# Patient Record
Sex: MALE | Race: WHITE | NOT HISPANIC OR LATINO | Employment: OTHER | URBAN - NONMETROPOLITAN AREA
[De-identification: names, ages, dates, MRNs, and addresses within clinical notes are randomized per-mention and may not be internally consistent; named-entity substitution may affect disease eponyms.]

---

## 2018-11-23 ENCOUNTER — APPOINTMENT (OUTPATIENT)
Dept: GENERAL RADIOLOGY | Facility: HOSPITAL | Age: 83
End: 2018-11-23

## 2018-11-23 ENCOUNTER — ANESTHESIA (OUTPATIENT)
Dept: PERIOP | Facility: HOSPITAL | Age: 83
End: 2018-11-23

## 2018-11-23 ENCOUNTER — HOSPITAL ENCOUNTER (INPATIENT)
Facility: HOSPITAL | Age: 83
LOS: 7 days | Discharge: HOME | End: 2018-11-30
Attending: INTERNAL MEDICINE | Admitting: UROLOGY

## 2018-11-23 ENCOUNTER — ANESTHESIA EVENT (OUTPATIENT)
Dept: PERIOP | Facility: HOSPITAL | Age: 83
End: 2018-11-23

## 2018-11-23 DIAGNOSIS — N32.0 BLADDER NECK CONTRACTURE: ICD-10-CM

## 2018-11-23 DIAGNOSIS — R33.9 URINARY RETENTION: Primary | ICD-10-CM

## 2018-11-23 LAB
BACTERIA UR QL AUTO: ABNORMAL /HPF
BILIRUB UR QL STRIP: NEGATIVE
CLARITY UR: ABNORMAL
COLOR UR: ABNORMAL
GLUCOSE UR STRIP-MCNC: NEGATIVE MG/DL
HGB UR QL STRIP.AUTO: ABNORMAL
KETONES UR QL STRIP: ABNORMAL
LEUKOCYTE ESTERASE UR QL STRIP.AUTO: ABNORMAL
NITRITE UR QL STRIP: POSITIVE
PH UR STRIP.AUTO: 7 [PH] (ref 5–8)
PROT UR QL STRIP: ABNORMAL
RBC # UR: ABNORMAL /HPF
REF LAB TEST METHOD: ABNORMAL
SP GR UR STRIP: 1.01 (ref 1–1.03)
SQUAMOUS #/AREA URNS HPF: ABNORMAL /HPF
UROBILINOGEN UR QL STRIP: ABNORMAL
WBC UR QL AUTO: ABNORMAL /HPF

## 2018-11-23 PROCEDURE — 93005 ELECTROCARDIOGRAM TRACING: CPT | Performed by: INTERNAL MEDICINE

## 2018-11-23 PROCEDURE — C1769 GUIDE WIRE: HCPCS | Performed by: UROLOGY

## 2018-11-23 PROCEDURE — 94799 UNLISTED PULMONARY SVC/PX: CPT

## 2018-11-23 PROCEDURE — 81001 URINALYSIS AUTO W/SCOPE: CPT | Performed by: INTERNAL MEDICINE

## 2018-11-23 PROCEDURE — 25010000002 FENTANYL CITRATE (PF) 100 MCG/2ML SOLUTION: Performed by: NURSE ANESTHETIST, CERTIFIED REGISTERED

## 2018-11-23 PROCEDURE — 71045 X-RAY EXAM CHEST 1 VIEW: CPT

## 2018-11-23 PROCEDURE — BT10ZZZ FLUOROSCOPY OF BLADDER: ICD-10-PCS | Performed by: UROLOGY

## 2018-11-23 PROCEDURE — 74018 RADEX ABDOMEN 1 VIEW: CPT

## 2018-11-23 PROCEDURE — 25010000002 IOPAMIDOL 61 % SOLUTION: Performed by: UROLOGY

## 2018-11-23 PROCEDURE — C1726 CATH, BAL DIL, NON-VASCULAR: HCPCS | Performed by: UROLOGY

## 2018-11-23 PROCEDURE — 0T9B80Z DRAINAGE OF BLADDER WITH DRAINAGE DEVICE, VIA NATURAL OR ARTIFICIAL OPENING ENDOSCOPIC: ICD-10-PCS | Performed by: UROLOGY

## 2018-11-23 PROCEDURE — 25010000002 PROPOFOL 10 MG/ML EMULSION: Performed by: NURSE ANESTHETIST, CERTIFIED REGISTERED

## 2018-11-23 PROCEDURE — 25010000002 LEVOFLOXACIN PER 250 MG: Performed by: UROLOGY

## 2018-11-23 PROCEDURE — 0T7C8ZZ DILATION OF BLADDER NECK, VIA NATURAL OR ARTIFICIAL OPENING ENDOSCOPIC: ICD-10-PCS | Performed by: UROLOGY

## 2018-11-23 PROCEDURE — 99223 1ST HOSP IP/OBS HIGH 75: CPT | Performed by: UROLOGY

## 2018-11-23 PROCEDURE — 52005 CYSTO W/URTRL CATHJ: CPT | Performed by: UROLOGY

## 2018-11-23 PROCEDURE — 87086 URINE CULTURE/COLONY COUNT: CPT | Performed by: UROLOGY

## 2018-11-23 PROCEDURE — 93010 ELECTROCARDIOGRAM REPORT: CPT | Performed by: INTERNAL MEDICINE

## 2018-11-23 PROCEDURE — 94760 N-INVAS EAR/PLS OXIMETRY 1: CPT

## 2018-11-23 RX ORDER — FENTANYL CITRATE 50 UG/ML
INJECTION, SOLUTION INTRAMUSCULAR; INTRAVENOUS AS NEEDED
Status: DISCONTINUED | OUTPATIENT
Start: 2018-11-23 | End: 2018-11-23 | Stop reason: SURG

## 2018-11-23 RX ORDER — LEVOFLOXACIN 5 MG/ML
500 INJECTION, SOLUTION INTRAVENOUS ONCE
Status: COMPLETED | OUTPATIENT
Start: 2018-11-23 | End: 2018-11-23

## 2018-11-23 RX ORDER — ROFLUMILAST 500 UG/1
500 TABLET ORAL DAILY
Status: DISCONTINUED | OUTPATIENT
Start: 2018-11-23 | End: 2018-11-30 | Stop reason: HOSPADM

## 2018-11-23 RX ORDER — OXYCODONE AND ACETAMINOPHEN 7.5; 325 MG/1; MG/1
2 TABLET ORAL ONCE AS NEEDED
Status: DISCONTINUED | OUTPATIENT
Start: 2018-11-23 | End: 2018-11-23 | Stop reason: HOSPADM

## 2018-11-23 RX ORDER — ONDANSETRON 2 MG/ML
4 INJECTION INTRAMUSCULAR; INTRAVENOUS EVERY 6 HOURS PRN
Status: DISCONTINUED | OUTPATIENT
Start: 2018-11-23 | End: 2018-11-25

## 2018-11-23 RX ORDER — SODIUM CHLORIDE 0.9 % (FLUSH) 0.9 %
3 SYRINGE (ML) INJECTION EVERY 12 HOURS SCHEDULED
Status: DISCONTINUED | OUTPATIENT
Start: 2018-11-23 | End: 2018-11-24

## 2018-11-23 RX ORDER — PROPOFOL 10 MG/ML
VIAL (ML) INTRAVENOUS AS NEEDED
Status: DISCONTINUED | OUTPATIENT
Start: 2018-11-23 | End: 2018-11-23 | Stop reason: SURG

## 2018-11-23 RX ORDER — ACETAMINOPHEN 325 MG/1
650 TABLET ORAL EVERY 4 HOURS PRN
Status: DISCONTINUED | OUTPATIENT
Start: 2018-11-23 | End: 2018-11-30 | Stop reason: HOSPADM

## 2018-11-23 RX ORDER — DABIGATRAN ETEXILATE 75 MG/1
75 CAPSULE ORAL 2 TIMES DAILY
Status: ON HOLD | COMMUNITY
End: 2018-11-25

## 2018-11-23 RX ORDER — PANTOPRAZOLE SODIUM 40 MG/1
40 TABLET, DELAYED RELEASE ORAL DAILY
COMMUNITY
End: 2020-05-05 | Stop reason: HOSPADM

## 2018-11-23 RX ORDER — BUDESONIDE AND FORMOTEROL FUMARATE DIHYDRATE 160; 4.5 UG/1; UG/1
2 AEROSOL RESPIRATORY (INHALATION)
COMMUNITY
End: 2020-05-05 | Stop reason: HOSPADM

## 2018-11-23 RX ORDER — DEXTROSE MONOHYDRATE 25 G/50ML
12.5 INJECTION, SOLUTION INTRAVENOUS AS NEEDED
Status: DISCONTINUED | OUTPATIENT
Start: 2018-11-23 | End: 2018-11-30 | Stop reason: HOSPADM

## 2018-11-23 RX ORDER — IPRATROPIUM BROMIDE AND ALBUTEROL SULFATE 2.5; .5 MG/3ML; MG/3ML
3 SOLUTION RESPIRATORY (INHALATION) ONCE AS NEEDED
Status: DISCONTINUED | OUTPATIENT
Start: 2018-11-23 | End: 2018-11-23 | Stop reason: HOSPADM

## 2018-11-23 RX ORDER — BUDESONIDE AND FORMOTEROL FUMARATE DIHYDRATE 160; 4.5 UG/1; UG/1
2 AEROSOL RESPIRATORY (INHALATION)
Status: DISCONTINUED | OUTPATIENT
Start: 2018-11-23 | End: 2018-11-30 | Stop reason: HOSPADM

## 2018-11-23 RX ORDER — PANTOPRAZOLE SODIUM 40 MG/1
40 TABLET, DELAYED RELEASE ORAL DAILY
Status: DISCONTINUED | OUTPATIENT
Start: 2018-11-23 | End: 2018-11-30 | Stop reason: HOSPADM

## 2018-11-23 RX ORDER — ONDANSETRON 2 MG/ML
4 INJECTION INTRAMUSCULAR; INTRAVENOUS EVERY 6 HOURS PRN
Status: DISCONTINUED | OUTPATIENT
Start: 2018-11-23 | End: 2018-11-30 | Stop reason: HOSPADM

## 2018-11-23 RX ORDER — FENTANYL CITRATE 50 UG/ML
25 INJECTION, SOLUTION INTRAMUSCULAR; INTRAVENOUS
Status: DISCONTINUED | OUTPATIENT
Start: 2018-11-23 | End: 2018-11-25

## 2018-11-23 RX ORDER — SODIUM CHLORIDE, SODIUM LACTATE, POTASSIUM CHLORIDE, CALCIUM CHLORIDE 600; 310; 30; 20 MG/100ML; MG/100ML; MG/100ML; MG/100ML
9 INJECTION, SOLUTION INTRAVENOUS CONTINUOUS
Status: DISCONTINUED | OUTPATIENT
Start: 2018-11-23 | End: 2018-11-25

## 2018-11-23 RX ORDER — OXYCODONE AND ACETAMINOPHEN 10; 325 MG/1; MG/1
1 TABLET ORAL ONCE AS NEEDED
Status: DISCONTINUED | OUTPATIENT
Start: 2018-11-23 | End: 2018-11-23 | Stop reason: HOSPADM

## 2018-11-23 RX ORDER — ACETAMINOPHEN 325 MG/1
650 TABLET ORAL EVERY 4 HOURS PRN
Status: DISCONTINUED | OUTPATIENT
Start: 2018-11-23 | End: 2018-11-25

## 2018-11-23 RX ORDER — SODIUM CHLORIDE, SODIUM LACTATE, POTASSIUM CHLORIDE, CALCIUM CHLORIDE 600; 310; 30; 20 MG/100ML; MG/100ML; MG/100ML; MG/100ML
INJECTION, SOLUTION INTRAVENOUS CONTINUOUS PRN
Status: DISCONTINUED | OUTPATIENT
Start: 2018-11-23 | End: 2018-11-23 | Stop reason: SURG

## 2018-11-23 RX ORDER — METOCLOPRAMIDE HYDROCHLORIDE 5 MG/ML
5 INJECTION INTRAMUSCULAR; INTRAVENOUS
Status: DISCONTINUED | OUTPATIENT
Start: 2018-11-23 | End: 2018-11-23 | Stop reason: HOSPADM

## 2018-11-23 RX ORDER — MEPERIDINE HYDROCHLORIDE 25 MG/ML
12.5 INJECTION INTRAMUSCULAR; INTRAVENOUS; SUBCUTANEOUS
Status: DISCONTINUED | OUTPATIENT
Start: 2018-11-23 | End: 2018-11-23 | Stop reason: HOSPADM

## 2018-11-23 RX ORDER — ONDANSETRON 2 MG/ML
4 INJECTION INTRAMUSCULAR; INTRAVENOUS ONCE AS NEEDED
Status: DISCONTINUED | OUTPATIENT
Start: 2018-11-23 | End: 2018-11-23 | Stop reason: HOSPADM

## 2018-11-23 RX ORDER — LABETALOL HYDROCHLORIDE 5 MG/ML
5 INJECTION, SOLUTION INTRAVENOUS
Status: DISCONTINUED | OUTPATIENT
Start: 2018-11-23 | End: 2018-11-23 | Stop reason: HOSPADM

## 2018-11-23 RX ORDER — FENTANYL CITRATE 50 UG/ML
25 INJECTION, SOLUTION INTRAMUSCULAR; INTRAVENOUS AS NEEDED
Status: DISCONTINUED | OUTPATIENT
Start: 2018-11-23 | End: 2018-11-23 | Stop reason: HOSPADM

## 2018-11-23 RX ORDER — SODIUM CHLORIDE 0.9 % (FLUSH) 0.9 %
3-10 SYRINGE (ML) INJECTION AS NEEDED
Status: DISCONTINUED | OUTPATIENT
Start: 2018-11-23 | End: 2018-11-25

## 2018-11-23 RX ORDER — MAGNESIUM HYDROXIDE 1200 MG/15ML
LIQUID ORAL AS NEEDED
Status: DISCONTINUED | OUTPATIENT
Start: 2018-11-23 | End: 2018-11-23 | Stop reason: HOSPADM

## 2018-11-23 RX ORDER — MIDAZOLAM HYDROCHLORIDE 1 MG/ML
2 INJECTION INTRAMUSCULAR; INTRAVENOUS
Status: DISCONTINUED | OUTPATIENT
Start: 2018-11-23 | End: 2018-11-25

## 2018-11-23 RX ORDER — LIDOCAINE HYDROCHLORIDE 20 MG/ML
INJECTION, SOLUTION INFILTRATION; PERINEURAL AS NEEDED
Status: DISCONTINUED | OUTPATIENT
Start: 2018-11-23 | End: 2018-11-23 | Stop reason: SURG

## 2018-11-23 RX ORDER — LABETALOL HYDROCHLORIDE 5 MG/ML
10 INJECTION, SOLUTION INTRAVENOUS EVERY 6 HOURS PRN
Status: DISCONTINUED | OUTPATIENT
Start: 2018-11-23 | End: 2018-11-30 | Stop reason: HOSPADM

## 2018-11-23 RX ORDER — MIDAZOLAM HYDROCHLORIDE 1 MG/ML
1 INJECTION INTRAMUSCULAR; INTRAVENOUS
Status: DISCONTINUED | OUTPATIENT
Start: 2018-11-23 | End: 2018-11-25

## 2018-11-23 RX ORDER — IBUPROFEN 600 MG/1
600 TABLET ORAL ONCE AS NEEDED
Status: DISCONTINUED | OUTPATIENT
Start: 2018-11-23 | End: 2018-11-23 | Stop reason: HOSPADM

## 2018-11-23 RX ORDER — LEVOFLOXACIN 5 MG/ML
INJECTION, SOLUTION INTRAVENOUS
Status: COMPLETED
Start: 2018-11-23 | End: 2018-11-23

## 2018-11-23 RX ORDER — ROFLUMILAST 500 UG/1
500 TABLET ORAL DAILY
COMMUNITY
End: 2020-05-05 | Stop reason: HOSPADM

## 2018-11-23 RX ORDER — SODIUM CHLORIDE 0.9 % (FLUSH) 0.9 %
1-10 SYRINGE (ML) INJECTION AS NEEDED
Status: DISCONTINUED | OUTPATIENT
Start: 2018-11-23 | End: 2018-11-30 | Stop reason: HOSPADM

## 2018-11-23 RX ORDER — NALOXONE HCL 0.4 MG/ML
0.4 VIAL (ML) INJECTION AS NEEDED
Status: DISCONTINUED | OUTPATIENT
Start: 2018-11-23 | End: 2018-11-23 | Stop reason: HOSPADM

## 2018-11-23 RX ADMIN — ROFLUMILAST 500 MCG: 500 TABLET ORAL at 17:57

## 2018-11-23 RX ADMIN — IPRATROPIUM BROMIDE 0.5 MG: 0.5 SOLUTION RESPIRATORY (INHALATION) at 20:49

## 2018-11-23 RX ADMIN — PANTOPRAZOLE SODIUM 40 MG: 40 TABLET, DELAYED RELEASE ORAL at 17:57

## 2018-11-23 RX ADMIN — LIDOCAINE HYDROCHLORIDE 100 MG: 20 INJECTION, SOLUTION INFILTRATION; PERINEURAL at 13:34

## 2018-11-23 RX ADMIN — PROPOFOL 100 MG: 10 INJECTION, EMULSION INTRAVENOUS at 13:34

## 2018-11-23 RX ADMIN — LIDOCAINE HYDROCHLORIDE: 20 JELLY TOPICAL at 12:45

## 2018-11-23 RX ADMIN — SODIUM CHLORIDE, POTASSIUM CHLORIDE, SODIUM LACTATE AND CALCIUM CHLORIDE: 600; 310; 30; 20 INJECTION, SOLUTION INTRAVENOUS at 13:21

## 2018-11-23 RX ADMIN — FENTANYL CITRATE 100 MCG: 50 INJECTION, SOLUTION INTRAMUSCULAR; INTRAVENOUS at 13:34

## 2018-11-23 RX ADMIN — BUDESONIDE AND FORMOTEROL FUMARATE DIHYDRATE 2 PUFF: 160; 4.5 AEROSOL RESPIRATORY (INHALATION) at 20:49

## 2018-11-23 RX ADMIN — LEVOFLOXACIN 500 MG: 5 INJECTION, SOLUTION INTRAVENOUS at 13:21

## 2018-11-23 NOTE — ANESTHESIA PROCEDURE NOTES
ANESTHESIA INTUBATION  Urgency: elective    Airway not difficult    General Information and Staff    Patient location during procedure: OR  CRNA: Stanton Lunsford CRNA    Indications and Patient Condition  Indications for airway management: airway protection    Preoxygenated: yes  MILS maintained throughout  Mask difficulty assessment: 1 - vent by mask    Final Airway Details  Final airway type: endotracheal airway      Successful airway: ETT  Cuffed: yes   Successful intubation technique: direct laryngoscopy  Endotracheal tube insertion site: oral  Blade: Adler  Blade size: 2  ETT size (mm): 7.5  Cormack-Lehane Classification: grade I - full view of glottis  Placement verified by: chest auscultation and capnometry   Cuff volume (mL): 5  Measured from: lips  ETT to lips (cm): 20  Number of attempts at approach: 1

## 2018-11-23 NOTE — ANESTHESIA PREPROCEDURE EVALUATION
Anesthesia Evaluation     Patient summary reviewed   no history of anesthetic complications:  NPO Solid Status: > 8 hours             Airway   Mallampati: II  TM distance: >3 FB  Neck ROM: full  Dental    (+) edentulous    Pulmonary    (+) COPD,   (-) sleep apnea, not a smoker  Cardiovascular   Exercise tolerance: good (4-7 METS)    ECG reviewed    (+) hypertension, dysrhythmias Atrial Fib,   (-) pacemaker, CAD, angina, cardiac stents      Neuro/Psych  (+) TIA,     (-) seizures, CVA  GI/Hepatic/Renal/Endo    (+)   renal disease (urinary retention and renal mass),   (-) diabetes    Musculoskeletal     Abdominal    Substance History      OB/GYN          Other                      Anesthesia Plan    ASA 3     general     intravenous induction   Anesthetic plan, all risks, benefits, and alternatives have been provided, discussed and informed consent has been obtained with: patient.

## 2018-11-23 NOTE — ANESTHESIA POSTPROCEDURE EVALUATION
Patient: Gilmar Parish    Procedure Summary     Date:  11/23/18 Room / Location:   [facility] OR  / [facility]    Anesthesia Start:  1321 Anesthesia Stop:  1405    Procedure:  CYSTOSCOPY WITH URETHRAL DIALATION AND COMPLEX CATHETER PLACEMENT (N/A Urethra) Diagnosis:       Urinary retention      (Urinary retention [R33.9])    Surgeon:  Alexx Garcia MD Provider:  David Espitia CRNA    Anesthesia Type:  general ASA Status:  3          Anesthesia Type: general  Last vitals  BP   172/94(nurse notified) (11/23/18 1202)   Temp   97.4 °F (36.3 °C) (11/23/18 1202)   Pulse   90 (11/23/18 1223)   Resp   16 (11/23/18 1202)     SpO2   94 % (11/23/18 1223)     Post Anesthesia Care and Evaluation    Patient location during evaluation: PACU  Patient participation: complete - patient participated  Level of consciousness: awake and alert  Pain management: adequate  Airway patency: patent  Anesthetic complications: No anesthetic complications    Cardiovascular status: acceptable  Respiratory status: acceptable  Hydration status: acceptable    Comments: Blood pressure 172/94, pulse 90, temperature 97.4 °F (36.3 °C), temperature source Oral, resp. rate 16, SpO2 94 %.    Pt discharged from PACU based on edwina score >8

## 2018-11-24 LAB
ANION GAP SERPL CALCULATED.3IONS-SCNC: 9 MMOL/L (ref 4–13)
BUN BLD-MCNC: 13 MG/DL (ref 5–21)
BUN/CREAT SERPL: 11.1 (ref 7–25)
CALCIUM SPEC-SCNC: 8.3 MG/DL (ref 8.4–10.4)
CHLORIDE SERPL-SCNC: 106 MMOL/L (ref 98–110)
CO2 SERPL-SCNC: 26 MMOL/L (ref 24–31)
CREAT BLD-MCNC: 1.17 MG/DL (ref 0.5–1.4)
DEPRECATED RDW RBC AUTO: 45.8 FL (ref 40–54)
ERYTHROCYTE [DISTWIDTH] IN BLOOD BY AUTOMATED COUNT: 15 % (ref 12–15)
FERRITIN SERPL-MCNC: 75.5 NG/ML (ref 17.9–464)
FOLATE SERPL-MCNC: 7.76 NG/ML
GFR SERPL CREATININE-BSD FRML MDRD: 58 ML/MIN/1.73
GLUCOSE BLD-MCNC: 88 MG/DL (ref 70–100)
HCT VFR BLD AUTO: 29 % (ref 40–52)
HGB BLD-MCNC: 9.3 G/DL (ref 14–18)
IRON 24H UR-MRATE: 32 MCG/DL (ref 42–180)
IRON SATN MFR SERPL: 14 % (ref 20–45)
MCH RBC QN AUTO: 27 PG (ref 28–32)
MCHC RBC AUTO-ENTMCNC: 32.1 G/DL (ref 33–36)
MCV RBC AUTO: 84.1 FL (ref 82–95)
PLATELET # BLD AUTO: 289 10*3/MM3 (ref 130–400)
PMV BLD AUTO: 10.6 FL (ref 6–12)
POTASSIUM BLD-SCNC: 3.6 MMOL/L (ref 3.5–5.3)
RBC # BLD AUTO: 3.45 10*6/MM3 (ref 4.8–5.9)
SODIUM BLD-SCNC: 141 MMOL/L (ref 135–145)
TIBC SERPL-MCNC: 224 MCG/DL (ref 225–420)
VIT B12 BLD-MCNC: 522 PG/ML (ref 239–931)
WBC NRBC COR # BLD: 6.66 10*3/MM3 (ref 4.8–10.8)

## 2018-11-24 PROCEDURE — 85027 COMPLETE CBC AUTOMATED: CPT | Performed by: INTERNAL MEDICINE

## 2018-11-24 PROCEDURE — 99233 SBSQ HOSP IP/OBS HIGH 50: CPT | Performed by: UROLOGY

## 2018-11-24 PROCEDURE — 83540 ASSAY OF IRON: CPT | Performed by: INTERNAL MEDICINE

## 2018-11-24 PROCEDURE — 94799 UNLISTED PULMONARY SVC/PX: CPT

## 2018-11-24 PROCEDURE — 80048 BASIC METABOLIC PNL TOTAL CA: CPT | Performed by: INTERNAL MEDICINE

## 2018-11-24 PROCEDURE — 94760 N-INVAS EAR/PLS OXIMETRY 1: CPT

## 2018-11-24 PROCEDURE — 82607 VITAMIN B-12: CPT | Performed by: INTERNAL MEDICINE

## 2018-11-24 PROCEDURE — 82728 ASSAY OF FERRITIN: CPT | Performed by: INTERNAL MEDICINE

## 2018-11-24 PROCEDURE — 83550 IRON BINDING TEST: CPT | Performed by: INTERNAL MEDICINE

## 2018-11-24 PROCEDURE — 25010000002 LEVOFLOXACIN PER 250 MG: Performed by: UROLOGY

## 2018-11-24 PROCEDURE — 82746 ASSAY OF FOLIC ACID SERUM: CPT | Performed by: INTERNAL MEDICINE

## 2018-11-24 RX ORDER — LEVOFLOXACIN 5 MG/ML
500 INJECTION, SOLUTION INTRAVENOUS EVERY 24 HOURS
Status: DISCONTINUED | OUTPATIENT
Start: 2018-11-24 | End: 2018-11-25

## 2018-11-24 RX ORDER — SODIUM CHLORIDE 9 MG/ML
75 INJECTION, SOLUTION INTRAVENOUS CONTINUOUS
Status: DISCONTINUED | OUTPATIENT
Start: 2018-11-24 | End: 2018-11-25

## 2018-11-24 RX ADMIN — BUDESONIDE AND FORMOTEROL FUMARATE DIHYDRATE 2 PUFF: 160; 4.5 AEROSOL RESPIRATORY (INHALATION) at 18:53

## 2018-11-24 RX ADMIN — ROFLUMILAST 500 MCG: 500 TABLET ORAL at 08:54

## 2018-11-24 RX ADMIN — PANTOPRAZOLE SODIUM 40 MG: 40 TABLET, DELAYED RELEASE ORAL at 08:54

## 2018-11-24 RX ADMIN — LEVOFLOXACIN 500 MG: 5 INJECTION, SOLUTION INTRAVENOUS at 14:10

## 2018-11-24 RX ADMIN — SODIUM CHLORIDE 75 ML/HR: 9 INJECTION, SOLUTION INTRAVENOUS at 13:00

## 2018-11-24 RX ADMIN — IPRATROPIUM BROMIDE 0.5 MG: 0.5 SOLUTION RESPIRATORY (INHALATION) at 18:45

## 2018-11-24 RX ADMIN — SODIUM CHLORIDE 500 ML: 0.9 INJECTION, SOLUTION INTRAVENOUS at 13:00

## 2018-11-24 RX ADMIN — IPRATROPIUM BROMIDE 0.5 MG: 0.5 SOLUTION RESPIRATORY (INHALATION) at 06:39

## 2018-11-24 RX ADMIN — BUDESONIDE AND FORMOTEROL FUMARATE DIHYDRATE 2 PUFF: 160; 4.5 AEROSOL RESPIRATORY (INHALATION) at 06:54

## 2018-11-25 LAB
ANION GAP SERPL CALCULATED.3IONS-SCNC: 9 MMOL/L (ref 4–13)
BACTERIA SPEC AEROBE CULT: NORMAL
BUN BLD-MCNC: 11 MG/DL (ref 5–21)
BUN/CREAT SERPL: 10.6 (ref 7–25)
CALCIUM SPEC-SCNC: 8.5 MG/DL (ref 8.4–10.4)
CHLORIDE SERPL-SCNC: 108 MMOL/L (ref 98–110)
CO2 SERPL-SCNC: 25 MMOL/L (ref 24–31)
CREAT BLD-MCNC: 1.04 MG/DL (ref 0.5–1.4)
DEPRECATED RDW RBC AUTO: 44.8 FL (ref 40–54)
ERYTHROCYTE [DISTWIDTH] IN BLOOD BY AUTOMATED COUNT: 14.7 % (ref 12–15)
GFR SERPL CREATININE-BSD FRML MDRD: 67 ML/MIN/1.73
GLUCOSE BLD-MCNC: 88 MG/DL (ref 70–100)
HCT VFR BLD AUTO: 29.7 % (ref 40–52)
HGB BLD-MCNC: 9.7 G/DL (ref 14–18)
MCH RBC QN AUTO: 27.2 PG (ref 28–32)
MCHC RBC AUTO-ENTMCNC: 32.7 G/DL (ref 33–36)
MCV RBC AUTO: 83.2 FL (ref 82–95)
PLATELET # BLD AUTO: 287 10*3/MM3 (ref 130–400)
PMV BLD AUTO: 10.4 FL (ref 6–12)
POTASSIUM BLD-SCNC: 3.7 MMOL/L (ref 3.5–5.3)
RBC # BLD AUTO: 3.57 10*6/MM3 (ref 4.8–5.9)
SODIUM BLD-SCNC: 142 MMOL/L (ref 135–145)
WBC NRBC COR # BLD: 6.13 10*3/MM3 (ref 4.8–10.8)

## 2018-11-25 PROCEDURE — 94799 UNLISTED PULMONARY SVC/PX: CPT

## 2018-11-25 PROCEDURE — 80048 BASIC METABOLIC PNL TOTAL CA: CPT | Performed by: INTERNAL MEDICINE

## 2018-11-25 PROCEDURE — 85027 COMPLETE CBC AUTOMATED: CPT | Performed by: INTERNAL MEDICINE

## 2018-11-25 PROCEDURE — 94760 N-INVAS EAR/PLS OXIMETRY 1: CPT

## 2018-11-25 PROCEDURE — 99232 SBSQ HOSP IP/OBS MODERATE 35: CPT | Performed by: UROLOGY

## 2018-11-25 RX ORDER — TRAMADOL HYDROCHLORIDE 50 MG/1
50 TABLET ORAL EVERY 6 HOURS PRN
Status: DISCONTINUED | OUTPATIENT
Start: 2018-11-25 | End: 2018-11-30 | Stop reason: HOSPADM

## 2018-11-25 RX ORDER — ASPIRIN 81 MG/1
81 TABLET ORAL DAILY
Start: 2018-11-25 | End: 2020-05-05 | Stop reason: HOSPADM

## 2018-11-25 RX ORDER — QUETIAPINE FUMARATE 25 MG/1
25 TABLET, FILM COATED ORAL NIGHTLY
Status: DISCONTINUED | OUTPATIENT
Start: 2018-11-25 | End: 2018-11-30 | Stop reason: HOSPADM

## 2018-11-25 RX ADMIN — SODIUM CHLORIDE 75 ML/HR: 9 INJECTION, SOLUTION INTRAVENOUS at 02:46

## 2018-11-25 RX ADMIN — IPRATROPIUM BROMIDE 0.5 MG: 0.5 SOLUTION RESPIRATORY (INHALATION) at 06:36

## 2018-11-25 RX ADMIN — PANTOPRAZOLE SODIUM 40 MG: 40 TABLET, DELAYED RELEASE ORAL at 09:24

## 2018-11-25 RX ADMIN — BUDESONIDE AND FORMOTEROL FUMARATE DIHYDRATE 2 PUFF: 160; 4.5 AEROSOL RESPIRATORY (INHALATION) at 06:36

## 2018-11-25 RX ADMIN — ROFLUMILAST 500 MCG: 500 TABLET ORAL at 09:24

## 2018-11-25 RX ADMIN — IPRATROPIUM BROMIDE 0.5 MG: 0.5 SOLUTION RESPIRATORY (INHALATION) at 13:13

## 2018-11-25 RX ADMIN — IPRATROPIUM BROMIDE 0.5 MG: 0.5 SOLUTION RESPIRATORY (INHALATION) at 00:17

## 2018-11-25 RX ADMIN — QUETIAPINE FUMARATE 25 MG: 25 TABLET, FILM COATED ORAL at 20:37

## 2018-11-25 RX ADMIN — BUDESONIDE AND FORMOTEROL FUMARATE DIHYDRATE 2 PUFF: 160; 4.5 AEROSOL RESPIRATORY (INHALATION) at 20:13

## 2018-11-26 PROCEDURE — 94760 N-INVAS EAR/PLS OXIMETRY 1: CPT

## 2018-11-26 PROCEDURE — 94799 UNLISTED PULMONARY SVC/PX: CPT

## 2018-11-26 PROCEDURE — 99233 SBSQ HOSP IP/OBS HIGH 50: CPT | Performed by: UROLOGY

## 2018-11-26 PROCEDURE — 99232 SBSQ HOSP IP/OBS MODERATE 35: CPT | Performed by: UROLOGY

## 2018-11-26 RX ADMIN — BUDESONIDE AND FORMOTEROL FUMARATE DIHYDRATE 2 PUFF: 160; 4.5 AEROSOL RESPIRATORY (INHALATION) at 18:59

## 2018-11-26 RX ADMIN — PANTOPRAZOLE SODIUM 40 MG: 40 TABLET, DELAYED RELEASE ORAL at 09:42

## 2018-11-26 RX ADMIN — ROFLUMILAST 500 MCG: 500 TABLET ORAL at 09:42

## 2018-11-26 RX ADMIN — QUETIAPINE FUMARATE 25 MG: 25 TABLET, FILM COATED ORAL at 22:00

## 2018-11-26 RX ADMIN — BUDESONIDE AND FORMOTEROL FUMARATE DIHYDRATE 2 PUFF: 160; 4.5 AEROSOL RESPIRATORY (INHALATION) at 06:33

## 2018-11-27 ENCOUNTER — APPOINTMENT (OUTPATIENT)
Dept: CT IMAGING | Facility: HOSPITAL | Age: 83
End: 2018-11-27

## 2018-11-27 ENCOUNTER — APPOINTMENT (OUTPATIENT)
Dept: NUCLEAR MEDICINE | Facility: HOSPITAL | Age: 83
End: 2018-11-27

## 2018-11-27 PROCEDURE — A9561 TC99M OXIDRONATE: HCPCS | Performed by: INTERNAL MEDICINE

## 2018-11-27 PROCEDURE — 25010000002 IOPAMIDOL 61 % SOLUTION: Performed by: INTERNAL MEDICINE

## 2018-11-27 PROCEDURE — 0 TECHNETIUM OXIDRONATE KIT: Performed by: INTERNAL MEDICINE

## 2018-11-27 PROCEDURE — 94760 N-INVAS EAR/PLS OXIMETRY 1: CPT

## 2018-11-27 PROCEDURE — 78306 BONE IMAGING WHOLE BODY: CPT

## 2018-11-27 PROCEDURE — 94799 UNLISTED PULMONARY SVC/PX: CPT

## 2018-11-27 PROCEDURE — 71260 CT THORAX DX C+: CPT

## 2018-11-27 RX ORDER — SODIUM CHLORIDE 9 MG/ML
40 INJECTION, SOLUTION INTRAVENOUS CONTINUOUS
Status: DISCONTINUED | OUTPATIENT
Start: 2018-11-27 | End: 2018-11-29

## 2018-11-27 RX ADMIN — BUDESONIDE AND FORMOTEROL FUMARATE DIHYDRATE 2 PUFF: 160; 4.5 AEROSOL RESPIRATORY (INHALATION) at 21:19

## 2018-11-27 RX ADMIN — BUDESONIDE AND FORMOTEROL FUMARATE DIHYDRATE 2 PUFF: 160; 4.5 AEROSOL RESPIRATORY (INHALATION) at 08:00

## 2018-11-27 RX ADMIN — QUETIAPINE FUMARATE 25 MG: 25 TABLET, FILM COATED ORAL at 21:40

## 2018-11-27 RX ADMIN — TECHNETIUM TC 99M OXIDRONATE 1 DOSE: 3.15 INJECTION, POWDER, LYOPHILIZED, FOR SOLUTION INTRAVENOUS at 08:00

## 2018-11-27 RX ADMIN — IOPAMIDOL 100 ML: 612 INJECTION, SOLUTION INTRAVENOUS at 12:45

## 2018-11-27 RX ADMIN — SODIUM CHLORIDE 40 ML/HR: 9 INJECTION, SOLUTION INTRAVENOUS at 15:41

## 2018-11-28 LAB
ANION GAP SERPL CALCULATED.3IONS-SCNC: 9 MMOL/L (ref 4–13)
BUN BLD-MCNC: 14 MG/DL (ref 5–21)
BUN/CREAT SERPL: 13.3 (ref 7–25)
CALCIUM SPEC-SCNC: 8.5 MG/DL (ref 8.4–10.4)
CHLORIDE SERPL-SCNC: 106 MMOL/L (ref 98–110)
CO2 SERPL-SCNC: 27 MMOL/L (ref 24–31)
CREAT BLD-MCNC: 1.05 MG/DL (ref 0.5–1.4)
DEPRECATED RDW RBC AUTO: 46.8 FL (ref 40–54)
ERYTHROCYTE [DISTWIDTH] IN BLOOD BY AUTOMATED COUNT: 15.1 % (ref 12–15)
GFR SERPL CREATININE-BSD FRML MDRD: 66 ML/MIN/1.73
GLUCOSE BLD-MCNC: 92 MG/DL (ref 70–100)
HCT VFR BLD AUTO: 30.2 % (ref 40–52)
HGB BLD-MCNC: 9.6 G/DL (ref 14–18)
MCH RBC QN AUTO: 27 PG (ref 28–32)
MCHC RBC AUTO-ENTMCNC: 31.8 G/DL (ref 33–36)
MCV RBC AUTO: 84.8 FL (ref 82–95)
PLATELET # BLD AUTO: 331 10*3/MM3 (ref 130–400)
PMV BLD AUTO: 10.6 FL (ref 6–12)
POTASSIUM BLD-SCNC: 3.8 MMOL/L (ref 3.5–5.3)
RBC # BLD AUTO: 3.56 10*6/MM3 (ref 4.8–5.9)
SODIUM BLD-SCNC: 142 MMOL/L (ref 135–145)
WBC NRBC COR # BLD: 6.26 10*3/MM3 (ref 4.8–10.8)

## 2018-11-28 PROCEDURE — 85027 COMPLETE CBC AUTOMATED: CPT | Performed by: INTERNAL MEDICINE

## 2018-11-28 PROCEDURE — 94799 UNLISTED PULMONARY SVC/PX: CPT

## 2018-11-28 PROCEDURE — 94760 N-INVAS EAR/PLS OXIMETRY 1: CPT

## 2018-11-28 PROCEDURE — 80048 BASIC METABOLIC PNL TOTAL CA: CPT | Performed by: INTERNAL MEDICINE

## 2018-11-28 RX ORDER — DOCUSATE SODIUM 100 MG/1
100 CAPSULE, LIQUID FILLED ORAL 2 TIMES DAILY
Status: DISCONTINUED | OUTPATIENT
Start: 2018-11-28 | End: 2018-11-30 | Stop reason: HOSPADM

## 2018-11-28 RX ADMIN — BUDESONIDE AND FORMOTEROL FUMARATE DIHYDRATE 2 PUFF: 160; 4.5 AEROSOL RESPIRATORY (INHALATION) at 20:43

## 2018-11-28 RX ADMIN — PANTOPRAZOLE SODIUM 40 MG: 40 TABLET, DELAYED RELEASE ORAL at 08:48

## 2018-11-28 RX ADMIN — QUETIAPINE FUMARATE 25 MG: 25 TABLET, FILM COATED ORAL at 20:15

## 2018-11-28 RX ADMIN — DOCUSATE SODIUM 100 MG: 100 CAPSULE ORAL at 13:25

## 2018-11-28 RX ADMIN — DOCUSATE SODIUM 100 MG: 100 CAPSULE ORAL at 20:15

## 2018-11-28 RX ADMIN — SODIUM CHLORIDE 40 ML/HR: 9 INJECTION, SOLUTION INTRAVENOUS at 17:40

## 2018-11-28 RX ADMIN — BUDESONIDE AND FORMOTEROL FUMARATE DIHYDRATE 2 PUFF: 160; 4.5 AEROSOL RESPIRATORY (INHALATION) at 07:43

## 2018-11-28 RX ADMIN — ROFLUMILAST 500 MCG: 500 TABLET ORAL at 08:48

## 2018-11-28 RX ADMIN — POLYETHYLENE GLYCOL 3350 17 G: 17 POWDER, FOR SOLUTION ORAL at 13:25

## 2018-11-29 ENCOUNTER — APPOINTMENT (OUTPATIENT)
Dept: NUCLEAR MEDICINE | Facility: HOSPITAL | Age: 83
End: 2018-11-29

## 2018-11-29 PROCEDURE — 94760 N-INVAS EAR/PLS OXIMETRY 1: CPT

## 2018-11-29 PROCEDURE — 94799 UNLISTED PULMONARY SVC/PX: CPT

## 2018-11-29 PROCEDURE — 78708 K FLOW/FUNCT IMAGE W/DRUG: CPT

## 2018-11-29 PROCEDURE — 25010000002 FUROSEMIDE PER 20 MG: Performed by: INTERNAL MEDICINE

## 2018-11-29 PROCEDURE — A9562 TC99M MERTIATIDE: HCPCS | Performed by: INTERNAL MEDICINE

## 2018-11-29 PROCEDURE — 0 TECHNETIUM MERTIATIDE: Performed by: INTERNAL MEDICINE

## 2018-11-29 RX ORDER — LISINOPRIL 5 MG/1
5 TABLET ORAL
Status: DISCONTINUED | OUTPATIENT
Start: 2018-11-29 | End: 2018-11-29

## 2018-11-29 RX ORDER — FUROSEMIDE 10 MG/ML
40 INJECTION INTRAMUSCULAR; INTRAVENOUS
Status: COMPLETED | OUTPATIENT
Start: 2018-11-29 | End: 2018-11-29

## 2018-11-29 RX ORDER — AMLODIPINE BESYLATE 5 MG/1
5 TABLET ORAL
Status: DISCONTINUED | OUTPATIENT
Start: 2018-11-29 | End: 2018-11-30 | Stop reason: HOSPADM

## 2018-11-29 RX ADMIN — BUDESONIDE AND FORMOTEROL FUMARATE DIHYDRATE 2 PUFF: 160; 4.5 AEROSOL RESPIRATORY (INHALATION) at 07:20

## 2018-11-29 RX ADMIN — ROFLUMILAST 500 MCG: 500 TABLET ORAL at 09:34

## 2018-11-29 RX ADMIN — FUROSEMIDE 40 MG: 10 INJECTION, SOLUTION INTRAVENOUS at 09:15

## 2018-11-29 RX ADMIN — POLYETHYLENE GLYCOL 3350 17 G: 17 POWDER, FOR SOLUTION ORAL at 09:34

## 2018-11-29 RX ADMIN — TECHNESCAN TC 99M MERTIATIDE 1 DOSE: 1 INJECTION, POWDER, LYOPHILIZED, FOR SOLUTION INTRAVENOUS at 08:15

## 2018-11-29 RX ADMIN — BUDESONIDE AND FORMOTEROL FUMARATE DIHYDRATE 2 PUFF: 160; 4.5 AEROSOL RESPIRATORY (INHALATION) at 20:00

## 2018-11-29 RX ADMIN — DOCUSATE SODIUM 100 MG: 100 CAPSULE ORAL at 09:34

## 2018-11-29 RX ADMIN — QUETIAPINE FUMARATE 25 MG: 25 TABLET, FILM COATED ORAL at 20:14

## 2018-11-29 RX ADMIN — DOCUSATE SODIUM 100 MG: 100 CAPSULE ORAL at 20:14

## 2018-11-29 RX ADMIN — PANTOPRAZOLE SODIUM 40 MG: 40 TABLET, DELAYED RELEASE ORAL at 09:34

## 2018-11-30 VITALS
TEMPERATURE: 98 F | OXYGEN SATURATION: 100 % | WEIGHT: 210.2 LBS | HEART RATE: 66 BPM | HEIGHT: 76 IN | DIASTOLIC BLOOD PRESSURE: 86 MMHG | SYSTOLIC BLOOD PRESSURE: 150 MMHG | RESPIRATION RATE: 18 BRPM | BODY MASS INDEX: 25.6 KG/M2

## 2018-11-30 LAB
ANION GAP SERPL CALCULATED.3IONS-SCNC: 11 MMOL/L (ref 4–13)
BUN BLD-MCNC: 15 MG/DL (ref 5–21)
BUN/CREAT SERPL: 13 (ref 7–25)
CALCIUM SPEC-SCNC: 8.5 MG/DL (ref 8.4–10.4)
CHLORIDE SERPL-SCNC: 103 MMOL/L (ref 98–110)
CO2 SERPL-SCNC: 27 MMOL/L (ref 24–31)
CREAT BLD-MCNC: 1.15 MG/DL (ref 0.5–1.4)
DEPRECATED RDW RBC AUTO: 45.6 FL (ref 40–54)
ERYTHROCYTE [DISTWIDTH] IN BLOOD BY AUTOMATED COUNT: 15 % (ref 12–15)
GFR SERPL CREATININE-BSD FRML MDRD: 59 ML/MIN/1.73
GLUCOSE BLD-MCNC: 96 MG/DL (ref 70–100)
HCT VFR BLD AUTO: 32.2 % (ref 40–52)
HGB BLD-MCNC: 10.5 G/DL (ref 14–18)
MCH RBC QN AUTO: 27.3 PG (ref 28–32)
MCHC RBC AUTO-ENTMCNC: 32.6 G/DL (ref 33–36)
MCV RBC AUTO: 83.9 FL (ref 82–95)
PLATELET # BLD AUTO: 343 10*3/MM3 (ref 130–400)
PMV BLD AUTO: 10.7 FL (ref 6–12)
POTASSIUM BLD-SCNC: 3.8 MMOL/L (ref 3.5–5.3)
RBC # BLD AUTO: 3.84 10*6/MM3 (ref 4.8–5.9)
SODIUM BLD-SCNC: 141 MMOL/L (ref 135–145)
WBC NRBC COR # BLD: 7.38 10*3/MM3 (ref 4.8–10.8)

## 2018-11-30 PROCEDURE — 94799 UNLISTED PULMONARY SVC/PX: CPT

## 2018-11-30 PROCEDURE — 80048 BASIC METABOLIC PNL TOTAL CA: CPT | Performed by: INTERNAL MEDICINE

## 2018-11-30 PROCEDURE — 94760 N-INVAS EAR/PLS OXIMETRY 1: CPT

## 2018-11-30 PROCEDURE — 85027 COMPLETE CBC AUTOMATED: CPT | Performed by: INTERNAL MEDICINE

## 2018-11-30 RX ORDER — TRAMADOL HYDROCHLORIDE 50 MG/1
50 TABLET ORAL EVERY 6 HOURS PRN
Qty: 12 TABLET | Refills: 0 | Status: SHIPPED | OUTPATIENT
Start: 2018-11-30 | End: 2018-12-05

## 2018-11-30 RX ORDER — QUETIAPINE FUMARATE 25 MG/1
25 TABLET, FILM COATED ORAL NIGHTLY
Qty: 30 TABLET | Refills: 1 | Status: SHIPPED | OUTPATIENT
Start: 2018-11-30

## 2018-11-30 RX ORDER — AMLODIPINE BESYLATE 5 MG/1
5 TABLET ORAL
Qty: 30 TABLET | Refills: 1 | Status: SHIPPED | OUTPATIENT
Start: 2018-12-01 | End: 2020-05-05 | Stop reason: HOSPADM

## 2018-11-30 RX ADMIN — PANTOPRAZOLE SODIUM 40 MG: 40 TABLET, DELAYED RELEASE ORAL at 08:11

## 2018-11-30 RX ADMIN — POLYETHYLENE GLYCOL 3350 17 G: 17 POWDER, FOR SOLUTION ORAL at 08:11

## 2018-11-30 RX ADMIN — BUDESONIDE AND FORMOTEROL FUMARATE DIHYDRATE 2 PUFF: 160; 4.5 AEROSOL RESPIRATORY (INHALATION) at 07:48

## 2018-11-30 RX ADMIN — DOCUSATE SODIUM 100 MG: 100 CAPSULE ORAL at 08:11

## 2018-11-30 RX ADMIN — AMLODIPINE BESYLATE 5 MG: 5 TABLET ORAL at 08:11

## 2018-11-30 RX ADMIN — ROFLUMILAST 500 MCG: 500 TABLET ORAL at 08:11

## 2018-12-01 ENCOUNTER — READMISSION MANAGEMENT (OUTPATIENT)
Dept: CALL CENTER | Facility: HOSPITAL | Age: 83
End: 2018-12-01

## 2018-12-01 NOTE — OUTREACH NOTE
Prep Survey      Responses   Facility patient discharged from?  [place]   Is patient eligible?  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 of TIA, normocytic anemia with chronic disease, s/p urethral dilation and booker catheter placement in OR   Does the patient have one of the following disease processes/diagnoses(primary or secondary)?  General Surgery   Does the patient have Home health ordered?  No   Is there a DME ordered?  No   Comments regarding appointments  See AVS   Prep survey completed?  Yes          Lavinia Medley RN

## 2018-12-03 ENCOUNTER — HOSPITAL ENCOUNTER (EMERGENCY)
Facility: HOSPITAL | Age: 83
Discharge: HOME | End: 2018-12-03
Attending: EMERGENCY MEDICINE | Admitting: EMERGENCY MEDICINE

## 2018-12-03 VITALS
HEART RATE: 61 BPM | BODY MASS INDEX: 24.99 KG/M2 | TEMPERATURE: 98 F | WEIGHT: 201 LBS | DIASTOLIC BLOOD PRESSURE: 73 MMHG | SYSTOLIC BLOOD PRESSURE: 139 MMHG | OXYGEN SATURATION: 97 % | RESPIRATION RATE: 15 BRPM | HEIGHT: 75 IN

## 2018-12-03 DIAGNOSIS — R33.9 URINARY RETENTION: Primary | ICD-10-CM

## 2018-12-03 LAB
BACTERIA UR QL AUTO: ABNORMAL /HPF
BILIRUB UR QL STRIP: NEGATIVE
CLARITY UR: CLEAR
COLOR UR: YELLOW
GLUCOSE UR STRIP-MCNC: NEGATIVE MG/DL
HGB UR QL STRIP.AUTO: ABNORMAL
HYALINE CASTS UR QL AUTO: ABNORMAL /LPF
KETONES UR QL STRIP: NEGATIVE
LEUKOCYTE ESTERASE UR QL STRIP.AUTO: NEGATIVE
NITRITE UR QL STRIP: NEGATIVE
PH UR STRIP.AUTO: 6.5 [PH] (ref 5–8)
PROT UR QL STRIP: NEGATIVE
RBC # UR: ABNORMAL /HPF
REF LAB TEST METHOD: ABNORMAL
SP GR UR STRIP: 1.01 (ref 1–1.03)
SQUAMOUS #/AREA URNS HPF: ABNORMAL /HPF
UROBILINOGEN UR QL STRIP: ABNORMAL
WBC UR QL AUTO: ABNORMAL /HPF

## 2018-12-03 PROCEDURE — 99284 EMERGENCY DEPT VISIT MOD MDM: CPT

## 2018-12-03 PROCEDURE — 81001 URINALYSIS AUTO W/SCOPE: CPT | Performed by: EMERGENCY MEDICINE

## 2018-12-03 PROCEDURE — 51702 INSERT TEMP BLADDER CATH: CPT

## 2018-12-03 NOTE — ED PROVIDER NOTES
Subjective   Patient is a 89-year-old male who presents with acute urinary retention.  Patient was discharged from the hospital back on November 25 with similar complaint of retention with hematuria  And this was secondary to bladder neck contracture status post urethral dilation.  He does have a known right renal mass and history of paroxysmal A. fib on pradaxa.  He began to have gross hematuria initially and could not pass any urine last week.  He initially presented to [facility] emergency department and they were unable to place a catheter.  He was transferred here for urology.  Patient did require catheter placement in the OR and had urethral dilation.  This was removed on November 29 without difficulty and he was voiding.  Last evening, he began to have retention again and has only been able to pass very small amounts of urine.  UA was negative for UTI.            Review of Systems   Constitutional: Negative for fever.   HENT: Negative for sore throat.    Eyes: Negative for visual disturbance.   Respiratory: Negative for shortness of breath.    Cardiovascular: Negative for chest pain.   Gastrointestinal: Negative for abdominal pain.   Genitourinary: Positive for decreased urine volume and difficulty urinating. Negative for hematuria, penile pain, penile swelling, scrotal swelling and testicular pain.   Musculoskeletal: Negative for back pain.   Skin: Negative for rash.   Neurological: Negative for headaches.       No past medical history on file.    No Known Allergies    No past surgical history on file.    No family history on file.    Social History     Socioeconomic History   • Marital status:      Spouse name: Not on file   • Number of children: Not on file   • Years of education: Not on file   • Highest education level: Not on file   Tobacco Use   • Smoking status: Never Smoker   • Smokeless tobacco: Never Used       Lab Results (last 24 hours)     ** No results found for the last 24 hours. **     "      Objective   Physical Exam   Constitutional: He is oriented to person, place, and time. He appears well-developed and well-nourished. He is cooperative.  Non-toxic appearance. He does not appear ill. No distress.   HENT:   Head: Atraumatic.   Mouth/Throat: Oropharynx is clear and moist and mucous membranes are normal.   Eyes: EOM are normal. Pupils are equal, round, and reactive to light.   Neck: Normal range of motion. Neck supple.   Cardiovascular: Normal rate, regular rhythm and normal heart sounds.   Pulmonary/Chest: Effort normal and breath sounds normal. No respiratory distress. He has no wheezes. He has no rhonchi. He has no rales.   Abdominal: Soft. He exhibits no distension. There is no tenderness.   No CVA tenderness   Genitourinary: Testes normal and penis normal. Circumcised.   Neurological: He is alert and oriented to person, place, and time.   Skin: Skin is warm and dry.   Psychiatric: He has a normal mood and affect.   Nursing note and vitals reviewed.      Procedures         No orders to display       /72   Pulse 66   Temp 98.1 °F (36.7 °C)   Resp 16   Ht 190.5 cm (75\")   Wt 91.2 kg (201 lb)   SpO2 98%   BMI 25.12 kg/m²     ED Course    ED Course as of Dec 03 1218   Mon Dec 03, 2018   1134 This is a 89-year-old male who presents with concerns for urinary retention.  No distention on exam and patient well-appearing.  Bladder scan showed 160 mL and bladder.  [TH]   1215 I talked to Dr. Ellison who stated since he has had OR catheterization recently we should be out of place an 18 [place] coudé here.  [TH]   1216 Catheter was passed without difficulty by nursing staff.  Minimal return of urine.  We will place a leg bag and have him follow-up with Dr. Elder this week.  [TH]      ED Course User Index  [TH] Lester Samayoa MD       Medications - No data to display         MDM  Number of Diagnoses or Management Options  Urinary retention: new and requires workup     Amount and/or " Complexity of Data Reviewed  Clinical lab tests: reviewed  Tests in the radiology section of CPT®: reviewed  Decide to obtain previous medical records or to obtain history from someone other than the patient: yes  Review and summarize past medical records: yes  Discuss the patient with other providers: yes    Risk of Complications, Morbidity, and/or Mortality  Presenting problems: low  Diagnostic procedures: minimal  Management options: low    Patient Progress  Patient progress: improved      Final diagnoses:   Urinary retention          Lester Samayoa MD  12/03/18 1219       Lester Samayoa MD  12/03/18 1222

## 2018-12-04 ENCOUNTER — READMISSION MANAGEMENT (OUTPATIENT)
Dept: CALL CENTER | Facility: HOSPITAL | Age: 83
End: 2018-12-04

## 2018-12-04 NOTE — OUTREACH NOTE
General Surgery Week 1 Survey      Responses   Facility patient discharged from?  [place]   Does the patient have one of the following disease processes/diagnoses(primary or secondary)?  General Surgery   Is there a successful TCM telephone encounter documented?  No   Week 1 attempt successful?  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 of TIA, normocytic anemia with chronic disease, s/p urethral dilation and booker catheter placement in OR   Is patient permission given to speak with other caregiver?  Yes   List who call center can speak with  Cirilo-son   Person spoke with today (if not patient) and relationship  Cirilo   Meds reviewed with patient/caregiver?  Yes   Is the patient having any side effects they believe may be caused by any medication additions or changes?  No   Does the patient have all medications related to this admission filled (includes all antibiotics, pain medications, etc.)  Yes   Is the patient taking all medications as directed (includes completed medication regime)?  Yes   Does the patient have a follow up appointment scheduled with their surgeon?  Yes   Has the patient kept scheduled appointments due by today?  N/A [ED visit yesterday with placement of booker]   Has home health visited the patient within 72 hours of discharge?  N/A   Did the patient receive a copy of their discharge instructions?  Yes   Nursing interventions  Reviewed instructions with patient   What is the patient's perception of their health status since discharge?  Same   Is the patient/caregiver able to teach back signs and symptoms of incisional infection?  -- [na]   Week 1 call completed?  Yes          Lavinia Pendleton RN

## 2018-12-06 ENCOUNTER — OFFICE VISIT (OUTPATIENT)
Dept: UROLOGY | Facility: CLINIC | Age: 83
End: 2018-12-06

## 2018-12-06 VITALS — WEIGHT: 210 LBS | BODY MASS INDEX: 25.57 KG/M2 | HEIGHT: 76 IN

## 2018-12-06 DIAGNOSIS — N28.89 RIGHT RENAL MASS: Primary | ICD-10-CM

## 2018-12-06 DIAGNOSIS — N32.0 BLADDER NECK CONTRACTURE: ICD-10-CM

## 2018-12-06 DIAGNOSIS — R33.9 URINARY RETENTION: ICD-10-CM

## 2018-12-06 PROCEDURE — 99213 OFFICE O/P EST LOW 20 MIN: CPT | Performed by: UROLOGY

## 2018-12-06 NOTE — PROGRESS NOTES
Mr. Parish is 89 y.o. male    Chief Complaint   Patient presents with   • Urinary Retention   • Renal mass       Urinary Retention   This is a new problem. The current episode started 1 to 4 weeks ago. The problem occurs constantly. The problem has been resolved. Pertinent negatives include no abdominal pain, chills or fever. Nothing aggravates the symptoms. Treatments tried: bladder neck dilation. The treatment provided significant relief.       The following portions of the patient's history were reviewed and updated as appropriate: allergies, current medications, past family history, past medical history, past social history, past surgical history and problem list.    Review of Systems   Constitutional: Negative for chills and fever.   Gastrointestinal: Negative for abdominal pain, anal bleeding and blood in stool.   Genitourinary: Negative for decreased urine volume, difficulty urinating, discharge, dysuria, enuresis, flank pain, frequency, genital sores, hematuria, penile pain, penile swelling, scrotal swelling, testicular pain and urgency.         Current Outpatient Medications:   •  aclidinium bromide (TUDORZA PRESSAIR) 400 MCG/ACT aerosol powder  powder for inhalation, Inhale 1 puff 2 (Two) Times a Day., Disp: , Rfl:   •  amLODIPine (NORVASC) 5 MG tablet, Take 1 tablet by mouth Daily., Disp: 30 tablet, Rfl: 1  •  aspirin 81 MG EC tablet, Take 1 tablet by mouth Daily., Disp: , Rfl:   •  budesonide-formoterol (SYMBICORT) 160-4.5 MCG/ACT inhaler, Inhale 2 puffs 2 (Two) Times a Day., Disp: , Rfl:   •  pantoprazole (PROTONIX) 40 MG EC tablet, Take 40 mg by mouth Daily., Disp: , Rfl:   •  QUEtiapine (SEROquel) 25 MG tablet, Take 1 tablet by mouth Every Night., Disp: 30 tablet, Rfl: 1  •  roflumilast (DALIRESP) 500 MCG tablet tablet, Take 500 mcg by mouth Daily., Disp: , Rfl:     Past Medical History:   Diagnosis Date   • COPD (chronic obstructive pulmonary disease) (CMS/[facility])        History reviewed. No pertinent  "surgical history.    Social History     Socioeconomic History   • Marital status:      Spouse name: Not on file   • Number of children: Not on file   • Years of education: Not on file   • Highest education level: Not on file   Tobacco Use   • Smoking status: Former Smoker     Last attempt to quit: 1989     Years since quittin.9   • Smokeless tobacco: Never Used   Substance and Sexual Activity   • Alcohol use: No     Frequency: Never   • Drug use: No   • Sexual activity: Defer       History reviewed. No pertinent family history.    Objective    Ht 193 cm (76\")   Wt 95.3 kg (210 lb)   BMI 25.56 kg/m²     Physical Exam    Admission on 2018, Discharged on 2018   Component Date Value Ref Range Status   • Color, UA 2018 Yellow  Yellow, Straw Final   • Appearance, UA 2018 Clear  Clear Final   • pH, UA 2018 6.5  5.0 - 8.0 Final   • Specific Gravity, UA 2018 1.010  1.005 - 1.030 Final   • Glucose, UA 2018 Negative  Negative Final   • Ketones, UA 2018 Negative  Negative Final   • Bilirubin, UA 2018 Negative  Negative Final   • Blood, UA 2018 Moderate (2+)* Negative Final   • Protein, UA 2018 Negative  Negative Final   • Leuk Esterase, UA 2018 Negative  Negative Final   • Nitrite, UA 2018 Negative  Negative Final   • Urobilinogen, UA 2018 0.2 E.U./dL  0.2 - 1.0 E.U./dL Final   • RBC, UA 2018 13-20* None Seen /HPF Final   • WBC, UA 2018 0-2* None Seen /HPF Final   • Bacteria, UA 2018 None Seen  None Seen /HPF Final   • Squamous Epithelial Cells, UA 2018 0-2  None Seen, 0-2 /HPF Final   • Hyaline Casts, UA 2018 0-2  None Seen /LPF Final   • Methodology 2018 Automated Microscopy   Final       Results for orders placed or performed during the hospital encounter of 18   Urinalysis With Culture If Indicated - Urine, Catheter   Result Value Ref Range    Color, UA Yellow Yellow, Straw    " Appearance, UA Clear Clear    pH, UA 6.5 5.0 - 8.0    Specific Gravity, UA 1.010 1.005 - 1.030    Glucose, UA Negative Negative    Ketones, UA Negative Negative    Bilirubin, UA Negative Negative    Blood, UA Moderate (2+) (A) Negative    Protein, UA Negative Negative    Leuk Esterase, UA Negative Negative    Nitrite, UA Negative Negative    Urobilinogen, UA 0.2 E.U./dL 0.2 - 1.0 E.U./dL   Urinalysis, Microscopic Only - Urine, Catheter   Result Value Ref Range    RBC, UA 13-20 (A) None Seen /HPF    WBC, UA 0-2 (A) None Seen /HPF    Bacteria, UA None Seen None Seen /HPF    Squamous Epithelial Cells, UA 0-2 None Seen, 0-2 /HPF    Hyaline Casts, UA 0-2 None Seen /LPF    Methodology Automated Microscopy      Patient's Body mass index is 25.56 kg/m². BMI is above normal parameters. Recommendations include: educational material.    Assessment and Plan    Gilmar was seen today for urinary retention and renal mass.    Diagnoses and all orders for this visit:    Right renal mass  -     Ambulatory Referral to Urology    Urinary retention    Bladder neck contracture    Patient with a history of bladder neck contracture who underwent bladder neck dilation with a locums position.  He initially had his catheter taken out from the hospital but apparently went to the emergency room unable to urinate and a catheter was replaced.  Today his bladder was filled with 250 mL of sterile saline and he is able to urinate without difficulty.  I did discuss with him the possibility of recurrence of his bladder neck contracture, and discussed that if he cannot urinate or notices that his urine stream is slowing that he should come to the hospital call our office immediately.    Initially patient was seen in the hospital and found to have an incidental large right renal mass.  There is age with his comorbidities, he was not deemed a good surgical candidate.  This discussion was had with his family and they chose not to pursue surgical  intervention and were considering an oncologic approach through a possible chemotherapy or immunotherapy.  He shows up today with his son have changed her mind and are interested in surgical intervention.  Again I do not think is a good surgical candidate at his age with his comorbidities.  They are seeking a second opinion and I have made him referral to [facility].  They do understand that the physician at [facility] may also not consider him a surgical candidate as well.  I have instructed them to get a copy of the disc of the CT scan and bring this with him the appointment.  Physician at [facility].    Approximately 15 minutes was spent with patient face-to-face discussing this with greater than 50% spent counseling.

## 2018-12-12 ENCOUNTER — READMISSION MANAGEMENT (OUTPATIENT)
Dept: CALL CENTER | Facility: HOSPITAL | Age: 83
End: 2018-12-12

## 2018-12-13 ENCOUNTER — READMISSION MANAGEMENT (OUTPATIENT)
Dept: CALL CENTER | Facility: HOSPITAL | Age: 83
End: 2018-12-13

## 2018-12-13 NOTE — OUTREACH NOTE
General Surgery Week 2 Survey      Responses   Facility patient discharged from?  [facility]   Does the patient have one of the following disease processes/diagnoses(primary or secondary)?  General Surgery   Week 2 attempt successful?  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 of TIA, normocytic anemia with chronic disease, s/p urethral dilation and booker catheter placement in OR   Is patient permission given to speak with other caregiver?  Yes   List who call center can speak with  Cirilo-son   Person spoke with today (if not patient) and relationship  Cirilo   Meds reviewed with patient/caregiver?  Yes   Is the patient having any side effects they believe may be caused by any medication additions or changes?  No   Does the patient have all medications related to this admission filled (includes all antibiotics, pain medications, etc.)  Yes   Is the patient taking all medications as directed (includes completed medication regime)?  Yes   Does the patient have a follow up appointment scheduled with their surgeon?  Yes   Has the patient kept scheduled appointments due by today?  Yes   Comments  States he has appt at [facility] 1/2 about kidney removed   Has home health visited the patient within 72 hours of discharge?  N/A   Psychosocial issues?  No   Did the patient receive a copy of their discharge instructions?  Yes   Nursing interventions  Reviewed instructions with patient   What is the patient's perception of their health status since discharge?  Improving   Nursing interventions  Nurse provided patient education   Is the patient/caregiver able to teach back the hierarchy of who to call/visit for symptoms/problems? PCP, Specialist, Home health nurse, Urgent Care, ED, 911  No   Week 2 call completed?   Yes          Debra Forbes RN

## 2020-05-02 ENCOUNTER — APPOINTMENT (OUTPATIENT)
Dept: CT IMAGING | Facility: HOSPITAL | Age: 85
End: 2020-05-02

## 2020-05-02 ENCOUNTER — HOSPITAL ENCOUNTER (INPATIENT)
Facility: HOSPITAL | Age: 85
LOS: 2 days | Discharge: HOSPICE | End: 2020-05-05
Attending: INTERNAL MEDICINE | Admitting: INTERNAL MEDICINE

## 2020-05-02 ENCOUNTER — APPOINTMENT (OUTPATIENT)
Dept: GENERAL RADIOLOGY | Facility: HOSPITAL | Age: 85
End: 2020-05-02

## 2020-05-02 DIAGNOSIS — C64.9 METASTATIC RENAL CELL CARCINOMA, UNSPECIFIED LATERALITY (HCC): ICD-10-CM

## 2020-05-02 DIAGNOSIS — K76.9 LIVER LESION: ICD-10-CM

## 2020-05-02 DIAGNOSIS — D64.9 ANEMIA, UNSPECIFIED TYPE: Primary | ICD-10-CM

## 2020-05-02 DIAGNOSIS — K92.2 GASTROINTESTINAL HEMORRHAGE, UNSPECIFIED GASTROINTESTINAL HEMORRHAGE TYPE: ICD-10-CM

## 2020-05-02 LAB
ABO GROUP BLD: NORMAL
ALBUMIN SERPL-MCNC: 4 G/DL (ref 3.5–5.2)
ALBUMIN/GLOB SERPL: 1.2 G/DL
ALP SERPL-CCNC: 108 U/L (ref 39–117)
ALT SERPL W P-5'-P-CCNC: 6 U/L (ref 1–41)
AMMONIA BLD-SCNC: 10 UMOL/L (ref 16–60)
AMYLASE SERPL-CCNC: 67 U/L (ref 28–100)
ANION GAP SERPL CALCULATED.3IONS-SCNC: 16 MMOL/L (ref 5–15)
APTT PPP: 31.5 SECONDS (ref 24.1–35)
ARTERIAL PATENCY WRIST A: POSITIVE
AST SERPL-CCNC: 12 U/L (ref 1–40)
ATMOSPHERIC PRESS: 749 MMHG
BACTERIA UR QL AUTO: ABNORMAL /HPF
BASE EXCESS BLDA CALC-SCNC: -2 MMOL/L (ref 0–2)
BASOPHILS # BLD AUTO: 0.03 10*3/MM3 (ref 0–0.2)
BASOPHILS NFR BLD AUTO: 0.3 % (ref 0–1.5)
BDY SITE: ABNORMAL
BILIRUB SERPL-MCNC: 0.2 MG/DL (ref 0.2–1.2)
BILIRUB UR QL STRIP: NEGATIVE
BLD GP AB SCN SERPL QL: NEGATIVE
BODY TEMPERATURE: 37 C
BUN BLD-MCNC: 53 MG/DL (ref 8–23)
BUN/CREAT SERPL: 27.3 (ref 7–25)
CALCIUM SPEC-SCNC: 10.1 MG/DL (ref 8.2–9.6)
CHLORIDE SERPL-SCNC: 105 MMOL/L (ref 98–107)
CLARITY UR: ABNORMAL
CO2 SERPL-SCNC: 22 MMOL/L (ref 22–29)
COLOR UR: ABNORMAL
CREAT BLD-MCNC: 1.94 MG/DL (ref 0.76–1.27)
D DIMER PPP FEU-MCNC: 1.46 MG/L (FEU) (ref 0–0.5)
D-LACTATE SERPL-SCNC: 3.5 MMOL/L (ref 0.5–2)
DEPRECATED RDW RBC AUTO: 49.1 FL (ref 37–54)
DEVELOPER EXPIRATION DATE: ABNORMAL
DEVELOPER LOT NUMBER: 171
EOSINOPHIL # BLD AUTO: 0.31 10*3/MM3 (ref 0–0.4)
EOSINOPHIL NFR BLD AUTO: 3.3 % (ref 0.3–6.2)
ERYTHROCYTE [DISTWIDTH] IN BLOOD BY AUTOMATED COUNT: 16.4 % (ref 12.3–15.4)
EXPIRATION DATE: ABNORMAL
FECAL OCCULT BLOOD SCREEN, POC: POSITIVE
GFR SERPL CREATININE-BSD FRML MDRD: 32 ML/MIN/1.73
GLOBULIN UR ELPH-MCNC: 3.3 GM/DL
GLUCOSE BLD-MCNC: 126 MG/DL (ref 65–99)
GLUCOSE UR STRIP-MCNC: NEGATIVE MG/DL
GRAN CASTS URNS QL MICRO: ABNORMAL /LPF
HCO3 BLDA-SCNC: 22.1 MMOL/L (ref 20–26)
HCT VFR BLD AUTO: 18.6 % (ref 37.5–51)
HGB BLD-MCNC: 5.5 G/DL (ref 13–17.7)
HGB UR QL STRIP.AUTO: NEGATIVE
HYALINE CASTS UR QL AUTO: ABNORMAL /LPF
IMM GRANULOCYTES # BLD AUTO: 0.05 10*3/MM3 (ref 0–0.05)
IMM GRANULOCYTES NFR BLD AUTO: 0.5 % (ref 0–0.5)
INR PPP: 1.13 (ref 0.91–1.09)
KETONES UR QL STRIP: ABNORMAL
LACTATE HOLD SPECIMEN: NORMAL
LEUKOCYTE ESTERASE UR QL STRIP.AUTO: ABNORMAL
LIPASE SERPL-CCNC: 66 U/L (ref 13–60)
LYMPHOCYTES # BLD AUTO: 1.21 10*3/MM3 (ref 0.7–3.1)
LYMPHOCYTES NFR BLD AUTO: 12.8 % (ref 19.6–45.3)
Lab: 171
Lab: ABNORMAL
MCH RBC QN AUTO: 24.3 PG (ref 26.6–33)
MCHC RBC AUTO-ENTMCNC: 29.6 G/DL (ref 31.5–35.7)
MCV RBC AUTO: 82.3 FL (ref 79–97)
MODALITY: ABNORMAL
MONOCYTES # BLD AUTO: 0.79 10*3/MM3 (ref 0.1–0.9)
MONOCYTES NFR BLD AUTO: 8.4 % (ref 5–12)
NEGATIVE CONTROL: NEGATIVE
NEUTROPHILS # BLD AUTO: 7.05 10*3/MM3 (ref 1.7–7)
NEUTROPHILS NFR BLD AUTO: 74.7 % (ref 42.7–76)
NITRITE UR QL STRIP: POSITIVE
NRBC BLD AUTO-RTO: 0 /100 WBC (ref 0–0.2)
PCO2 BLDA: 33.3 MM HG (ref 35–45)
PH BLDA: 7.43 PH UNITS (ref 7.35–7.45)
PH UR STRIP.AUTO: <=5 [PH] (ref 5–8)
PLATELET # BLD AUTO: 481 10*3/MM3 (ref 140–450)
PMV BLD AUTO: 10.2 FL (ref 6–12)
PO2 BLDA: 86.4 MM HG (ref 83–108)
POSITIVE CONTROL: POSITIVE
POTASSIUM BLD-SCNC: 4.7 MMOL/L (ref 3.5–5.2)
PROCALCITONIN SERPL-MCNC: 0.26 NG/ML (ref 0.1–0.25)
PROT SERPL-MCNC: 7.3 G/DL (ref 6–8.5)
PROT UR QL STRIP: ABNORMAL
PROTHROMBIN TIME: 14.1 SECONDS (ref 11.9–14.6)
RBC # BLD AUTO: 2.26 10*6/MM3 (ref 4.14–5.8)
RBC # UR: ABNORMAL /HPF
REF LAB TEST METHOD: ABNORMAL
RH BLD: POSITIVE
SAO2 % BLDCOA: 98.2 % (ref 94–99)
SODIUM BLD-SCNC: 143 MMOL/L (ref 136–145)
SP GR UR STRIP: 1.02 (ref 1–1.03)
SQUAMOUS #/AREA URNS HPF: ABNORMAL /HPF
T&S EXPIRATION DATE: NORMAL
UROBILINOGEN UR QL STRIP: ABNORMAL
VENTILATOR MODE: ABNORMAL
WBC NRBC COR # BLD: 9.44 10*3/MM3 (ref 3.4–10.8)
WBC UR QL AUTO: ABNORMAL /HPF

## 2020-05-02 PROCEDURE — 86920 COMPATIBILITY TEST SPIN: CPT

## 2020-05-02 PROCEDURE — 87040 BLOOD CULTURE FOR BACTERIA: CPT | Performed by: PHYSICIAN ASSISTANT

## 2020-05-02 PROCEDURE — 36600 WITHDRAWAL OF ARTERIAL BLOOD: CPT

## 2020-05-02 PROCEDURE — 85730 THROMBOPLASTIN TIME PARTIAL: CPT | Performed by: PHYSICIAN ASSISTANT

## 2020-05-02 PROCEDURE — 83540 ASSAY OF IRON: CPT | Performed by: INTERNAL MEDICINE

## 2020-05-02 PROCEDURE — 99285 EMERGENCY DEPT VISIT HI MDM: CPT

## 2020-05-02 PROCEDURE — 82270 OCCULT BLOOD FECES: CPT | Performed by: PHYSICIAN ASSISTANT

## 2020-05-02 PROCEDURE — 85610 PROTHROMBIN TIME: CPT | Performed by: PHYSICIAN ASSISTANT

## 2020-05-02 PROCEDURE — 82803 BLOOD GASES ANY COMBINATION: CPT

## 2020-05-02 PROCEDURE — 82140 ASSAY OF AMMONIA: CPT | Performed by: PHYSICIAN ASSISTANT

## 2020-05-02 PROCEDURE — 84145 PROCALCITONIN (PCT): CPT | Performed by: PHYSICIAN ASSISTANT

## 2020-05-02 PROCEDURE — P9612 CATHETERIZE FOR URINE SPEC: HCPCS

## 2020-05-02 PROCEDURE — 82728 ASSAY OF FERRITIN: CPT | Performed by: INTERNAL MEDICINE

## 2020-05-02 PROCEDURE — 74176 CT ABD & PELVIS W/O CONTRAST: CPT

## 2020-05-02 PROCEDURE — 36430 TRANSFUSION BLD/BLD COMPNT: CPT

## 2020-05-02 PROCEDURE — 70450 CT HEAD/BRAIN W/O DYE: CPT

## 2020-05-02 PROCEDURE — P9016 RBC LEUKOCYTES REDUCED: HCPCS

## 2020-05-02 PROCEDURE — 83690 ASSAY OF LIPASE: CPT | Performed by: PHYSICIAN ASSISTANT

## 2020-05-02 PROCEDURE — 84466 ASSAY OF TRANSFERRIN: CPT | Performed by: INTERNAL MEDICINE

## 2020-05-02 PROCEDURE — 86901 BLOOD TYPING SEROLOGIC RH(D): CPT | Performed by: PHYSICIAN ASSISTANT

## 2020-05-02 PROCEDURE — 85379 FIBRIN DEGRADATION QUANT: CPT | Performed by: PHYSICIAN ASSISTANT

## 2020-05-02 PROCEDURE — 86850 RBC ANTIBODY SCREEN: CPT | Performed by: PHYSICIAN ASSISTANT

## 2020-05-02 PROCEDURE — 87086 URINE CULTURE/COLONY COUNT: CPT | Performed by: PHYSICIAN ASSISTANT

## 2020-05-02 PROCEDURE — 93010 ELECTROCARDIOGRAM REPORT: CPT | Performed by: INTERNAL MEDICINE

## 2020-05-02 PROCEDURE — 82150 ASSAY OF AMYLASE: CPT | Performed by: PHYSICIAN ASSISTANT

## 2020-05-02 PROCEDURE — 25010000002 CEFTRIAXONE PER 250 MG: Performed by: PHYSICIAN ASSISTANT

## 2020-05-02 PROCEDURE — 83605 ASSAY OF LACTIC ACID: CPT | Performed by: PHYSICIAN ASSISTANT

## 2020-05-02 PROCEDURE — 71250 CT THORAX DX C-: CPT

## 2020-05-02 PROCEDURE — 86900 BLOOD TYPING SEROLOGIC ABO: CPT | Performed by: PHYSICIAN ASSISTANT

## 2020-05-02 PROCEDURE — 80053 COMPREHEN METABOLIC PANEL: CPT | Performed by: PHYSICIAN ASSISTANT

## 2020-05-02 PROCEDURE — 81001 URINALYSIS AUTO W/SCOPE: CPT | Performed by: PHYSICIAN ASSISTANT

## 2020-05-02 PROCEDURE — 71045 X-RAY EXAM CHEST 1 VIEW: CPT

## 2020-05-02 PROCEDURE — 85045 AUTOMATED RETICULOCYTE COUNT: CPT | Performed by: INTERNAL MEDICINE

## 2020-05-02 PROCEDURE — 93005 ELECTROCARDIOGRAM TRACING: CPT | Performed by: PHYSICIAN ASSISTANT

## 2020-05-02 PROCEDURE — 86900 BLOOD TYPING SEROLOGIC ABO: CPT

## 2020-05-02 PROCEDURE — 85025 COMPLETE CBC W/AUTO DIFF WBC: CPT | Performed by: PHYSICIAN ASSISTANT

## 2020-05-02 RX ORDER — PANTOPRAZOLE SODIUM 40 MG/10ML
40 INJECTION, POWDER, LYOPHILIZED, FOR SOLUTION INTRAVENOUS ONCE
Status: COMPLETED | OUTPATIENT
Start: 2020-05-02 | End: 2020-05-02

## 2020-05-02 RX ORDER — SODIUM CHLORIDE 0.9 % (FLUSH) 0.9 %
10 SYRINGE (ML) INJECTION AS NEEDED
Status: DISCONTINUED | OUTPATIENT
Start: 2020-05-02 | End: 2020-05-05 | Stop reason: HOSPADM

## 2020-05-02 RX ORDER — METOPROLOL TARTRATE 100 MG/1
100 TABLET ORAL DAILY
COMMUNITY
End: 2020-05-05 | Stop reason: HOSPADM

## 2020-05-02 RX ADMIN — PANTOPRAZOLE SODIUM 40 MG: 40 INJECTION, POWDER, FOR SOLUTION INTRAVENOUS at 20:47

## 2020-05-02 RX ADMIN — CEFTRIAXONE SODIUM 1 G: 1 INJECTION, POWDER, FOR SOLUTION INTRAMUSCULAR; INTRAVENOUS at 22:33

## 2020-05-02 RX ADMIN — SODIUM CHLORIDE, POTASSIUM CHLORIDE, SODIUM LACTATE AND CALCIUM CHLORIDE 500 ML: 600; 310; 30; 20 INJECTION, SOLUTION INTRAVENOUS at 20:06

## 2020-05-02 RX ADMIN — SODIUM CHLORIDE, POTASSIUM CHLORIDE, SODIUM LACTATE AND CALCIUM CHLORIDE 1000 ML: 600; 310; 30; 20 INJECTION, SOLUTION INTRAVENOUS at 20:45

## 2020-05-03 PROBLEM — C64.9: Chronic | Status: ACTIVE | Noted: 2020-05-03

## 2020-05-03 PROBLEM — J44.9 COPD (CHRONIC OBSTRUCTIVE PULMONARY DISEASE) (HCC): Chronic | Status: ACTIVE | Noted: 2020-05-03

## 2020-05-03 PROBLEM — N39.0 ACUTE UTI (URINARY TRACT INFECTION): Status: ACTIVE | Noted: 2020-05-03

## 2020-05-03 PROBLEM — D64.9 ANEMIA: Status: ACTIVE | Noted: 2020-05-03

## 2020-05-03 PROBLEM — N28.89 RENAL MASS: Status: ACTIVE | Noted: 2019-02-11

## 2020-05-03 PROBLEM — I63.9 ISCHEMIC STROKE (HCC): Status: ACTIVE | Noted: 2020-05-03

## 2020-05-03 PROBLEM — C64.1: Status: ACTIVE | Noted: 2020-02-24

## 2020-05-03 PROBLEM — R19.5 HEME POSITIVE STOOL: Status: ACTIVE | Noted: 2020-05-03

## 2020-05-03 LAB
ANION GAP SERPL CALCULATED.3IONS-SCNC: 10 MMOL/L (ref 5–15)
BACTERIA SPEC AEROBE CULT: NORMAL
BASOPHILS # BLD AUTO: 0.03 10*3/MM3 (ref 0–0.2)
BASOPHILS NFR BLD AUTO: 0.4 % (ref 0–1.5)
BUN BLD-MCNC: 50 MG/DL (ref 8–23)
BUN/CREAT SERPL: 30.3 (ref 7–25)
CALCIUM SPEC-SCNC: 9.2 MG/DL (ref 8.2–9.6)
CHLORIDE SERPL-SCNC: 107 MMOL/L (ref 98–107)
CO2 SERPL-SCNC: 24 MMOL/L (ref 22–29)
CREAT BLD-MCNC: 1.65 MG/DL (ref 0.76–1.27)
D-LACTATE SERPL-SCNC: 1.7 MMOL/L (ref 0.5–2)
DEPRECATED RDW RBC AUTO: 47.9 FL (ref 37–54)
EOSINOPHIL # BLD AUTO: 0.41 10*3/MM3 (ref 0–0.4)
EOSINOPHIL NFR BLD AUTO: 5.3 % (ref 0.3–6.2)
ERYTHROCYTE [DISTWIDTH] IN BLOOD BY AUTOMATED COUNT: 15.9 % (ref 12.3–15.4)
FERRITIN SERPL-MCNC: 72.74 NG/ML (ref 30–400)
FOLATE SERPL-MCNC: 4.01 NG/ML (ref 4.78–24.2)
GFR SERPL CREATININE-BSD FRML MDRD: 39 ML/MIN/1.73
GLUCOSE BLD-MCNC: 132 MG/DL (ref 65–99)
HCT VFR BLD AUTO: 19.6 % (ref 37.5–51)
HCT VFR BLD AUTO: 20.3 % (ref 37.5–51)
HGB BLD-MCNC: 6.1 G/DL (ref 13–17.7)
HGB BLD-MCNC: 6.3 G/DL (ref 13–17.7)
IMM GRANULOCYTES # BLD AUTO: 0.04 10*3/MM3 (ref 0–0.05)
IMM GRANULOCYTES NFR BLD AUTO: 0.5 % (ref 0–0.5)
IRON 24H UR-MRATE: 23 MCG/DL (ref 59–158)
IRON SATN MFR SERPL: 6 % (ref 20–50)
LYMPHOCYTES # BLD AUTO: 1.51 10*3/MM3 (ref 0.7–3.1)
LYMPHOCYTES NFR BLD AUTO: 19.5 % (ref 19.6–45.3)
MCH RBC QN AUTO: 25.6 PG (ref 26.6–33)
MCHC RBC AUTO-ENTMCNC: 31 G/DL (ref 31.5–35.7)
MCV RBC AUTO: 82.5 FL (ref 79–97)
MONOCYTES # BLD AUTO: 0.76 10*3/MM3 (ref 0.1–0.9)
MONOCYTES NFR BLD AUTO: 9.8 % (ref 5–12)
NEUTROPHILS # BLD AUTO: 4.98 10*3/MM3 (ref 1.7–7)
NEUTROPHILS NFR BLD AUTO: 64.5 % (ref 42.7–76)
NRBC BLD AUTO-RTO: 0 /100 WBC (ref 0–0.2)
PLATELET # BLD AUTO: 385 10*3/MM3 (ref 140–450)
PMV BLD AUTO: 9.5 FL (ref 6–12)
POTASSIUM BLD-SCNC: 4.4 MMOL/L (ref 3.5–5.2)
RBC # BLD AUTO: 2.46 10*6/MM3 (ref 4.14–5.8)
RETICS # AUTO: 0.09 10*6/MM3 (ref 0.02–0.13)
RETICS/RBC NFR AUTO: 4.04 % (ref 0.7–1.9)
SODIUM BLD-SCNC: 141 MMOL/L (ref 136–145)
TIBC SERPL-MCNC: 374 MCG/DL (ref 298–536)
TRANSFERRIN SERPL-MCNC: 251 MG/DL (ref 200–360)
VIT B12 BLD-MCNC: 1567 PG/ML (ref 211–946)
WBC NRBC COR # BLD: 7.73 10*3/MM3 (ref 3.4–10.8)

## 2020-05-03 PROCEDURE — 82607 VITAMIN B-12: CPT | Performed by: INTERNAL MEDICINE

## 2020-05-03 PROCEDURE — P9016 RBC LEUKOCYTES REDUCED: HCPCS

## 2020-05-03 PROCEDURE — 94799 UNLISTED PULMONARY SVC/PX: CPT

## 2020-05-03 PROCEDURE — 85018 HEMOGLOBIN: CPT | Performed by: INTERNAL MEDICINE

## 2020-05-03 PROCEDURE — 82746 ASSAY OF FOLIC ACID SERUM: CPT | Performed by: INTERNAL MEDICINE

## 2020-05-03 PROCEDURE — 94640 AIRWAY INHALATION TREATMENT: CPT

## 2020-05-03 PROCEDURE — 85025 COMPLETE CBC W/AUTO DIFF WBC: CPT | Performed by: INTERNAL MEDICINE

## 2020-05-03 PROCEDURE — 99222 1ST HOSP IP/OBS MODERATE 55: CPT | Performed by: NURSE PRACTITIONER

## 2020-05-03 PROCEDURE — 85014 HEMATOCRIT: CPT | Performed by: INTERNAL MEDICINE

## 2020-05-03 PROCEDURE — 83605 ASSAY OF LACTIC ACID: CPT | Performed by: PHYSICIAN ASSISTANT

## 2020-05-03 PROCEDURE — 86900 BLOOD TYPING SEROLOGIC ABO: CPT

## 2020-05-03 PROCEDURE — 36430 TRANSFUSION BLD/BLD COMPNT: CPT

## 2020-05-03 PROCEDURE — 80048 BASIC METABOLIC PNL TOTAL CA: CPT | Performed by: INTERNAL MEDICINE

## 2020-05-03 RX ORDER — NALOXONE HCL 0.4 MG/ML
0.4 VIAL (ML) INJECTION
Status: DISCONTINUED | OUTPATIENT
Start: 2020-05-03 | End: 2020-05-05 | Stop reason: HOSPADM

## 2020-05-03 RX ORDER — LORAZEPAM 1 MG/1
1 TABLET ORAL
Status: DISCONTINUED | OUTPATIENT
Start: 2020-05-03 | End: 2020-05-05 | Stop reason: HOSPADM

## 2020-05-03 RX ORDER — ONDANSETRON 2 MG/ML
4 INJECTION INTRAMUSCULAR; INTRAVENOUS EVERY 6 HOURS PRN
Status: DISCONTINUED | OUTPATIENT
Start: 2020-05-03 | End: 2020-05-05 | Stop reason: HOSPADM

## 2020-05-03 RX ORDER — ACETAMINOPHEN 325 MG/1
650 TABLET ORAL EVERY 4 HOURS PRN
Status: DISCONTINUED | OUTPATIENT
Start: 2020-05-03 | End: 2020-05-05 | Stop reason: HOSPADM

## 2020-05-03 RX ORDER — LORAZEPAM 2 MG/ML
2 INJECTION INTRAMUSCULAR
Status: DISCONTINUED | OUTPATIENT
Start: 2020-05-03 | End: 2020-05-05 | Stop reason: HOSPADM

## 2020-05-03 RX ORDER — ONDANSETRON 4 MG/1
4 TABLET, FILM COATED ORAL EVERY 6 HOURS PRN
Status: DISCONTINUED | OUTPATIENT
Start: 2020-05-03 | End: 2020-05-05 | Stop reason: HOSPADM

## 2020-05-03 RX ORDER — ACETAMINOPHEN 650 MG/1
650 SUPPOSITORY RECTAL EVERY 4 HOURS PRN
Status: DISCONTINUED | OUTPATIENT
Start: 2020-05-03 | End: 2020-05-05 | Stop reason: HOSPADM

## 2020-05-03 RX ORDER — LORAZEPAM 0.5 MG/1
0.5 TABLET ORAL
Status: DISCONTINUED | OUTPATIENT
Start: 2020-05-03 | End: 2020-05-05 | Stop reason: HOSPADM

## 2020-05-03 RX ORDER — LORAZEPAM 2 MG/ML
0.5 CONCENTRATE ORAL
Status: DISCONTINUED | OUTPATIENT
Start: 2020-05-03 | End: 2020-05-05 | Stop reason: HOSPADM

## 2020-05-03 RX ORDER — LORAZEPAM 2 MG/ML
0.5 INJECTION INTRAMUSCULAR
Status: DISCONTINUED | OUTPATIENT
Start: 2020-05-03 | End: 2020-05-05 | Stop reason: HOSPADM

## 2020-05-03 RX ORDER — SODIUM CHLORIDE 0.9 % (FLUSH) 0.9 %
10 SYRINGE (ML) INJECTION AS NEEDED
Status: DISCONTINUED | OUTPATIENT
Start: 2020-05-03 | End: 2020-05-05 | Stop reason: HOSPADM

## 2020-05-03 RX ORDER — LORAZEPAM 2 MG/ML
1 CONCENTRATE ORAL
Status: DISCONTINUED | OUTPATIENT
Start: 2020-05-03 | End: 2020-05-05 | Stop reason: HOSPADM

## 2020-05-03 RX ORDER — METOPROLOL TARTRATE 100 MG/1
100 TABLET ORAL DAILY
Status: DISCONTINUED | OUTPATIENT
Start: 2020-05-03 | End: 2020-05-04

## 2020-05-03 RX ORDER — DIPHENOXYLATE HYDROCHLORIDE AND ATROPINE SULFATE 2.5; .025 MG/1; MG/1
1 TABLET ORAL
Status: DISCONTINUED | OUTPATIENT
Start: 2020-05-03 | End: 2020-05-05 | Stop reason: HOSPADM

## 2020-05-03 RX ORDER — LORAZEPAM 2 MG/ML
2 CONCENTRATE ORAL
Status: DISCONTINUED | OUTPATIENT
Start: 2020-05-03 | End: 2020-05-05 | Stop reason: HOSPADM

## 2020-05-03 RX ORDER — QUETIAPINE FUMARATE 25 MG/1
25 TABLET, FILM COATED ORAL NIGHTLY
Status: DISCONTINUED | OUTPATIENT
Start: 2020-05-03 | End: 2020-05-05 | Stop reason: HOSPADM

## 2020-05-03 RX ORDER — SODIUM CHLORIDE 0.9 % (FLUSH) 0.9 %
10 SYRINGE (ML) INJECTION EVERY 12 HOURS SCHEDULED
Status: DISCONTINUED | OUTPATIENT
Start: 2020-05-03 | End: 2020-05-05 | Stop reason: HOSPADM

## 2020-05-03 RX ORDER — IPRATROPIUM BROMIDE AND ALBUTEROL SULFATE 2.5; .5 MG/3ML; MG/3ML
3 SOLUTION RESPIRATORY (INHALATION) 4 TIMES DAILY PRN
Status: DISCONTINUED | OUTPATIENT
Start: 2020-05-03 | End: 2020-05-05 | Stop reason: HOSPADM

## 2020-05-03 RX ORDER — IPRATROPIUM BROMIDE AND ALBUTEROL SULFATE 2.5; .5 MG/3ML; MG/3ML
3 SOLUTION RESPIRATORY (INHALATION)
Status: DISCONTINUED | OUTPATIENT
Start: 2020-05-03 | End: 2020-05-03

## 2020-05-03 RX ORDER — ACETAMINOPHEN 160 MG/5ML
650 SOLUTION ORAL EVERY 4 HOURS PRN
Status: DISCONTINUED | OUTPATIENT
Start: 2020-05-03 | End: 2020-05-05 | Stop reason: HOSPADM

## 2020-05-03 RX ORDER — MORPHINE SULFATE 2 MG/ML
1 INJECTION, SOLUTION INTRAMUSCULAR; INTRAVENOUS
Status: DISCONTINUED | OUTPATIENT
Start: 2020-05-03 | End: 2020-05-05 | Stop reason: HOSPADM

## 2020-05-03 RX ORDER — SODIUM CHLORIDE 9 MG/ML
100 INJECTION, SOLUTION INTRAVENOUS CONTINUOUS
Status: DISCONTINUED | OUTPATIENT
Start: 2020-05-03 | End: 2020-05-03

## 2020-05-03 RX ORDER — AMLODIPINE BESYLATE 5 MG/1
5 TABLET ORAL
Status: DISCONTINUED | OUTPATIENT
Start: 2020-05-03 | End: 2020-05-04

## 2020-05-03 RX ORDER — LORAZEPAM 1 MG/1
2 TABLET ORAL
Status: DISCONTINUED | OUTPATIENT
Start: 2020-05-03 | End: 2020-05-05 | Stop reason: HOSPADM

## 2020-05-03 RX ORDER — CEFDINIR 300 MG/1
300 CAPSULE ORAL
Status: DISCONTINUED | OUTPATIENT
Start: 2020-05-03 | End: 2020-05-04

## 2020-05-03 RX ORDER — MORPHINE SULFATE 2 MG/ML
1 INJECTION, SOLUTION INTRAMUSCULAR; INTRAVENOUS EVERY 4 HOURS PRN
Status: DISCONTINUED | OUTPATIENT
Start: 2020-05-03 | End: 2020-05-05 | Stop reason: HOSPADM

## 2020-05-03 RX ORDER — PANTOPRAZOLE SODIUM 40 MG/1
40 TABLET, DELAYED RELEASE ORAL
Status: DISCONTINUED | OUTPATIENT
Start: 2020-05-03 | End: 2020-05-04

## 2020-05-03 RX ORDER — ROFLUMILAST 500 UG/1
500 TABLET ORAL DAILY
Status: DISCONTINUED | OUTPATIENT
Start: 2020-05-03 | End: 2020-05-04

## 2020-05-03 RX ORDER — LORAZEPAM 2 MG/ML
1 INJECTION INTRAMUSCULAR
Status: DISCONTINUED | OUTPATIENT
Start: 2020-05-03 | End: 2020-05-05 | Stop reason: HOSPADM

## 2020-05-03 RX ADMIN — PANTOPRAZOLE SODIUM 40 MG: 40 TABLET, DELAYED RELEASE ORAL at 10:47

## 2020-05-03 RX ADMIN — SODIUM CHLORIDE 100 ML/HR: 9 INJECTION, SOLUTION INTRAVENOUS at 03:08

## 2020-05-03 RX ADMIN — CEFDINIR 300 MG: 300 CAPSULE ORAL at 10:43

## 2020-05-03 RX ADMIN — ROFLUMILAST 500 MCG: 500 TABLET ORAL at 10:43

## 2020-05-03 RX ADMIN — QUETIAPINE FUMARATE 25 MG: 25 TABLET ORAL at 21:36

## 2020-05-03 RX ADMIN — IPRATROPIUM BROMIDE AND ALBUTEROL SULFATE 3 ML: 2.5; .5 SOLUTION RESPIRATORY (INHALATION) at 06:52

## 2020-05-03 RX ADMIN — SODIUM CHLORIDE 8 MG/HR: 900 INJECTION INTRAVENOUS at 03:08

## 2020-05-03 NOTE — PROGRESS NOTES
Discharge Planning Assessment  [facility]     Patient Name: Gilmar Parish  MRN: [id number]  Today's Date: 5/3/2020    Admit Date: 5/2/2020    Discharge Needs Assessment     Row Name 05/03/20 0854       Living Environment    Lives With  facility resident    Unique Family Situation  [place]    Current Living Arrangements  home/apartment/condo    Primary Care Provided by  child(chrystal)    Provides Primary Care For  child(chrystal)    Family Caregiver if Needed  child(chrystal), adult    Quality of Family Relationships  helpful;involved;supportive    Able to Return to Prior Arrangements  yes       Resource/Environmental Concerns    Resource/Environmental Concerns  none    Transportation Concerns  car, none       Transition Planning    Patient/Family Anticipates Transition to  home with family;inpatient rehabilitation facility;long term care facility    Patient/Family Anticipated Services at Transition  skilled nursing    Transportation Anticipated  family or friend will provide       Discharge Needs Assessment    Readmission Within the Last 30 Days  no previous admission in last 30 days    Concerns to be Addressed  no discharge needs identified;denies needs/concerns at this time    Equipment Currently Used at Home  walker, rolling;oxygen    Anticipated Changes Related to Illness  none    Equipment Needed After Discharge  none    Patient's Choice of Community Agency(s)  [facility]     Discharge Coordination/Progress  Pt has RX coverage and a PCP Pt lived at [facility] prior to admission. MESERET spoke with pts son who states that if pt can be discharged back to [facility] with hospice services that he thinks that would be the best idea. MESERET explained that MESERET will have to call [facility] tomorrow to see if this is possible. MESERET will follow for MD plan.         Discharge Plan    No documentation.       Destination      Coordination has not been started for this encounter.      Durable Medical Equipment       Coordination has not been started for this encounter.      Dialysis/Infusion      Coordination has not been started for this encounter.      Home Medical Care      Coordination has not been started for this encounter.      Therapy      Coordination has not been started for this encounter.      Community Resources      Coordination has not been started for this encounter.          Demographic Summary    No documentation.       Functional Status    No documentation.       Psychosocial    No documentation.       Abuse/Neglect    No documentation.       Legal    No documentation.       Substance Abuse    No documentation.       Patient Forms    No documentation.           Shireen Julio

## 2020-05-03 NOTE — DISCHARGE PLACEMENT REQUEST
"Gilmar Parish (89 y.o. Male)     Date of Birth Social Security Number Address Home Phone MRN    10/21/1924  [address] [address] [phone] [id number]    [employer]ian Marital Status          Confucianist        Admission Date Admission Type Admitting Provider Attending Provider Department, Room/Bed    5/2/20 Emergency Tim Fernandes MD [place], Maurice S, DO [facility] 3A, 325/1    Discharge Date Discharge Disposition Discharge Destination                       Attending Provider:  Gallo Cunningham DO    Allergies:  No Known Allergies    Isolation:  None   Infection:  None   Code Status:  No CPR    Ht:  193 cm (76\")   Wt:  95.3 kg (210 lb)    Admission Cmt:  None   Principal Problem:  None                Active Insurance as of 5/2/2020     Primary Coverage     Payor Plan Insurance Group Employer/Plan Group    MEDICARE MEDICARE A & B      Payor Plan Address Payor Plan Phone Number Payor Plan Fax Number Effective Dates    [address] [phone]  10/1/1989 - None Entered    [facility] [id number]       Subscriber Name Subscriber Birth Date Member ID       GILMAR PARISH 10/21/1924 [id number]           Secondary Coverage     Payor Plan Insurance Group Employer/Plan Group    [facility] SUPP [id number]     Payor Plan Address Payor Plan Phone Number Payor Plan Fax Number Effective Dates    [address]   1/1/2017 - None Entered    [facility] [id number]       Subscriber Name Subscriber Birth Date Member ID       GILMAR PARISH 10/21/1924 [id number]                 Emergency Contacts      (Rel.) Home Phone Work Phone Mobile Phone    LEANNE PARISH (Son) [phone] -- --               History & Physical      Tim Fernandes MD at 05/03/20 0216              [facility] Medicine Services  HISTORY AND PHYSICAL    Date of Admission: 5/2/2020  Primary Care Physician: Maryse Ch, " "DO    Subjective     Chief Complaint: Increased confusion    History of Present Illness  Patient is a 89-year-old white male past medical history of COPD and renal cell carcinoma status post right nephrectomy 2 years ago.  He also apparently has some early dementia.  He was sent over from [place] due to increasing confusion over the last 2 weeks.  He is found to have a mild UTI is also found to have a hemoglobin of 5.5 last from I can find was around 10.  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  Patient was evaluated in the ER as well found to have heme positive stool also imaging revealed a large 10 cm lesion arising from the surgical bed from his nephrectomy invading the liver.  He also has multiple pleural-based nodules largest 1 3.7 cm on the left upper lobe consistent with metastatic disease.  Patient has been transfused 1 unit working on his second unit of packed red cells.  Case was initially discussed with me my impression was that the patient is essentially hospice there is no point in admitting him for work-up however ER attending apparently discussed case with son and he stated he would like his father admitted to \"find out where the blood has gone\".  We have been asked to admit him for further evaluation.  The patient is a very poor historian he does complain of some right upper quadrant abdominal pain he also complains of worsening shortness of breath.  He is oriented to person somewhat to place he knows he is in the hospital he does not know the month he thinks it is 1954 and the Waldemar Forbes is the president [place].        Review of Systems   14 point review of systems negative except for as per HPI  However unreliable due to dementia.  Otherwise complete ROS reviewed and negative except as mentioned in the HPI.    Past Medical History:   Past Medical History:   Diagnosis Date   • COPD (chronic " "obstructive pulmonary disease) (CMS/[facility])      Past Surgical History:  Past Surgical History:   Procedure Laterality Date   • URETHRAL DILATATION N/A 11/23/2018    Procedure: CYSTOSCOPY WITH URETHRAL DIALATION AND COMPLEX CATHETER PLACEMENT;  Surgeon: Alexx Garcia MD;  Location: [facility] OR;  Service: Urology     Social History:  reports that he quit smoking about 31 years ago. He has never used smokeless tobacco. He reports that he does not drink alcohol or use drugs.    Family History:  Patient is unaware of his family history and is confused    Allergies:  No Known Allergies  Medications:  Prior to Admission medications    Medication Sig Start Date End Date Taking? Authorizing Provider   metoprolol tartrate (LOPRESSOR) 100 MG tablet Take 100 mg by mouth Daily.   Yes ProviderGloria MD   pantoprazole (PROTONIX) 40 MG EC tablet Take 40 mg by mouth Daily.   Yes Gloria Antoine MD   QUEtiapine (SEROquel) 25 MG tablet Take 1 tablet by mouth Every Night. 11/30/18  Yes Waldemar Girard,    roflumilast (DALIRESP) 500 MCG tablet tablet Take 500 mcg by mouth Daily.   Yes ProviderGloria MD   Umeclidinium Bromide (Incruse Ellipta) 62.5 MCG/INH aerosol powder  Inhale 1 puff Daily.   Yes ProviderGloria MD   aclidinium bromide (TUDORZA PRESSAIR) 400 MCG/ACT aerosol powder  powder for inhalation Inhale 1 puff 2 (Two) Times a Day.    ProviderGloria MD   amLODIPine (NORVASC) 5 MG tablet Take 1 tablet by mouth Daily. 12/1/18   Waldemar Girard DO   aspirin 81 MG EC tablet Take 1 tablet by mouth Daily. 11/25/18   Waldemar Girard, DO   budesonide-formoterol (SYMBICORT) 160-4.5 MCG/ACT inhaler Inhale 2 puffs 2 (Two) Times a Day.    ProviderGloria MD     Objective     Vital Signs: /95   Pulse 71   Temp 97.6 °F (36.4 °C) (Oral)   Resp 17   Ht 193 cm (76\")   Wt 95.3 kg (210 lb)   SpO2 94%   BMI 25.56 kg/m²    Physical Exam  Gen.:  Well-nourished well-developed white male in no " acute distress  HEENT: Atraumatic, normocephalic.  Pupils equal, round, and reactive to light. Extraocular movements intact.  Sclerae anicteric.  TMs negative.  Mucous membranes moist.  Neck is supple without lymphadenopathy.  No JVD is noted.  No carotid bruits are auscultated.  Oropharynx is without erythema or exudate.   Chest: Clear to auscultation and percussion.  CV: Regular rate and rhythm.  Normal S1-S2.  No gallops, murmurs. or rubs.  Abdomen: Soft, tenderness to palpation right upper quadrant nondistended.  Active bowel sounds,  No hepatosplenomegaly.  No masses.  No hernias.  Extremities: No clubbing, edema, or cyanosis.  Capillary refill is normal.  Pulses are 2+ and symmetric at radial and dorsalis pedis.  Posterior tibial pulses are intact and 2+ palpable.  Neuro: Patient is awake, alert, and oriented × 2 person somewhat to place knows he is in a hospital not [place] [address].  Cranial nerves II through XII are grossly intact.  Motor is 5 out of 5 bilaterally.  DTRs are 2+ and symmetric bilaterally. Sensory exam is nonfocal  Skin: Warm, dry, and intact.  No evidence of breakdown.  Sensorium: Mildly confused    Nursing notes and vital signs reviewed        Results Reviewed:  Lab Results (last 24 hours)     Procedure Component Value Units Date/Time    Lactic Acid, Reflex [[id number]]  (Normal) Collected:  05/03/20 0015    Specimen:  Blood Updated:  05/03/20 0039     Lactate 1.7 mmol/L     Lactic Acid, Reflex Timer (This will reflex a repeat order 3-3:15 hours after ordered.) [[id number]] Collected:  05/02/20 1946    Specimen:  Blood Updated:  05/02/20 2330     Hold Tube Hold for add-ons.     Comment: Auto resulted.       Blood Culture - Blood, Arm, Left [[id number]] Collected:  05/02/20 2020    Specimen:  Blood from Arm, Left Updated:  05/02/20 2039    Urinalysis With Culture If Indicated - Urine, Catheter In/Out [[id number]]  (Abnormal) Collected:  05/02/20 2002    Specimen:  Urine, Catheter In/Out Updated:   "05/02/20 2028     Color, UA Dark Yellow     Appearance, UA Cloudy     pH, UA <=5.0     Specific Gravity, UA 1.024     Glucose, UA Negative     Ketones, UA Trace     Bilirubin, UA Negative     Blood, UA Negative     Protein, UA 30 mg/dL (1+)     Leuk Esterase, UA Small (1+)     Nitrite, UA Positive     Urobilinogen, UA 0.2 E.U./dL    Urinalysis, Microscopic Only - Urine, Catheter In/Out [[id number]]  (Abnormal) Collected:  05/02/20 2002    Specimen:  Urine, Catheter In/Out Updated:  05/02/20 2028     RBC, UA 6-12 /HPF      WBC, UA 13-20 /HPF      Bacteria, UA 1+ /HPF      Squamous Epithelial Cells, UA 0-2 /HPF      Hyaline Casts, UA 7-12 /LPF      Granular Casts, UA 3-6 /LPF      Methodology Manual Light Microscopy    Urine Culture - Urine, Urine, Catheter In/Out [[id number]] Collected:  05/02/20 2002    Specimen:  Urine, Catheter In/Out Updated:  05/02/20 2028    Ammonia [[id number]]  (Abnormal) Collected:  05/02/20 1946    Specimen:  Blood Updated:  05/02/20 2024     Ammonia 10 umol/L     Procalcitonin [[id number]]  (Abnormal) Collected:  05/02/20 1946    Specimen:  Blood Updated:  05/02/20 2019     Procalcitonin 0.26 ng/mL     Narrative:       As a Marker for Sepsis (Non-Neonates):   1. <0.5 ng/mL represents a low risk of severe sepsis and/or septic shock.  1. >2 ng/mL represents a high risk of severe sepsis and/or septic shock.    As a Marker for Lower Respiratory Tract Infections that require antibiotic therapy:  PCT on Admission     Antibiotic Therapy             6-12 Hrs later  > 0.5                Strongly Recommended            >0.25 - <0.5         Recommended  0.1 - 0.25           Discouraged                   Remeasure/reassess PCT  <0.1                 Strongly Discouraged          Remeasure/reassess PCT      As 28 day mortality risk marker: \"Change in Procalcitonin Result\" (> 80 % or <=80 %) if Day 0 (or Day 1) and Day 4 values are available. Refer to http://www.[facility]-pct-calculator.com/   Change in PCT " <=80 %   A decrease of PCT levels below or equal to 80 % defines a positive change in PCT test result representing a higher risk for 28-day all-cause mortality of patients diagnosed with severe sepsis or septic shock.  Change in PCT > 80 %   A decrease of PCT levels of more than 80 % defines a negative change in PCT result representing a lower risk for 28-day all-cause mortality of patients diagnosed with severe sepsis or septic shock.                Results may be falsely decreased if patient taking Biotin.     Lactic Acid, Plasma [[id number]]  (Abnormal) Collected:  05/02/20 1946    Specimen:  Blood Updated:  05/02/20 2016     Lactate 3.5 mmol/L     Comprehensive Metabolic Panel [[id number]]  (Abnormal) Collected:  05/02/20 1946    Specimen:  Blood Updated:  05/02/20 2013     Glucose 126 mg/dL      BUN 53 mg/dL      Creatinine 1.94 mg/dL      Sodium 143 mmol/L      Potassium 4.7 mmol/L      Chloride 105 mmol/L      CO2 22.0 mmol/L      Calcium 10.1 mg/dL      Total Protein 7.3 g/dL      Albumin 4.00 g/dL      ALT (SGPT) 6 U/L      AST (SGOT) 12 U/L      Alkaline Phosphatase 108 U/L      Total Bilirubin 0.2 mg/dL      eGFR Non African Amer 32 mL/min/1.73      Globulin 3.3 gm/dL      A/G Ratio 1.2 g/dL      BUN/Creatinine Ratio 27.3     Anion Gap 16.0 mmol/L     Narrative:       GFR Normal >60  Chronic Kidney Disease <60  Kidney Failure <15      POC Occult Blood Stool [[id number]]  (Abnormal) Collected:  05/02/20 2010    Specimen:  Stool Updated:  05/02/20 2012     Fecal Occult Blood Positive     Lot Number [id number]     Expiration Date 12/31/2020     DEVELOPER LOT NUMBER [id number]     DEVELOPER EXPIRATION DATE 12/31/2020     Positive Control Positive     Negative Control Negative    Amylase [[id number]]  (Normal) Collected:  05/02/20 1946    Specimen:  Blood Updated:  05/02/20 2010     Amylase 67 U/L     Lipase [[id number]]  (Abnormal) Collected:  05/02/20 1946    Specimen:  Blood Updated:  05/02/20 2008     Lipase 66 U/L      Protime-INR [[id number]]  (Abnormal) Collected:  05/02/20 1946    Specimen:  Blood Updated:  05/02/20 2008     Protime 14.1 Seconds      INR 1.13    aPTT [[id number]]  (Normal) Collected:  05/02/20 1946    Specimen:  Blood Updated:  05/02/20 2008     PTT 31.5 seconds     D-dimer, Quantitative [[id number]]  (Abnormal) Collected:  05/02/20 1946    Specimen:  Blood Updated:  05/02/20 2008     D-Dimer, Quantitative 1.46 mg/L (FEU)     Narrative:       Reference Range is 0-0.50 mg/L FEU. However, results <0.50 mg/L FEU tends to rule out DVT or PE. Results >0.50 mg/L FEU are not useful in predicting absence or presence of DVT or PE.      Blood Culture - Blood, Arm, Left [[id number]] Collected:  05/02/20 1946    Specimen:  Blood from Arm, Left Updated:  05/02/20 2005    CBC & Differential [[id number]] Collected:  05/02/20 1946    Specimen:  Blood Updated:  05/02/20 2002    Narrative:       The following orders were created for panel order CBC & Differential.  Procedure                               Abnormality         Status                     ---------                               -----------         ------                     CBC Auto Differential[[id number]]        Abnormal            Final result                 Please view results for these tests on the individual orders.    CBC Auto Differential [[id number]]  (Abnormal) Collected:  05/02/20 1946    Specimen:  Blood Updated:  05/02/20 2002     WBC 9.44 10*3/mm3      RBC 2.26 10*6/mm3      Hemoglobin 5.5 g/dL      Hematocrit 18.6 %      MCV 82.3 fL      MCH 24.3 pg      MCHC 29.6 g/dL      RDW 16.4 %      RDW-SD 49.1 fl      MPV 10.2 fL      Platelets 481 10*3/mm3      Neutrophil % 74.7 %      Lymphocyte % 12.8 %      Monocyte % 8.4 %      Eosinophil % 3.3 %      Basophil % 0.3 %      Immature Grans % 0.5 %      Neutrophils, Absolute 7.05 10*3/mm3      Lymphocytes, Absolute 1.21 10*3/mm3      Monocytes, Absolute 0.79 10*3/mm3      Eosinophils, Absolute 0.31 10*3/mm3       Basophils, Absolute 0.03 10*3/mm3      Immature Grans, Absolute 0.05 10*3/mm3      nRBC 0.0 /100 WBC     Blood Gas, Arterial [[id number]]  (Abnormal) Collected:  05/02/20 1959    Specimen:  Arterial Blood Updated:  05/02/20 2000     Site Right Radial     Lowell's Test Positive     pH, Arterial 7.431 pH units      pCO2, Arterial 33.3 mm Hg      Comment: 84 Value below reference range        pO2, Arterial 86.4 mm Hg      HCO3, Arterial 22.1 mmol/L      Base Excess, Arterial -2.0 mmol/L      Comment: 84 Value below reference range        O2 Saturation, Arterial 98.2 %      Temperature 37.0 C      Barometric Pressure for Blood Gas 749 mmHg      Modality Room Air     Ventilator Mode NA     Collected by [id number]     Comment: Meter: [id number]     :  [id number]           Imaging Results (Last 24 Hours)     Procedure Component Value Units Date/Time    CT Head Without Contrast [[id number]] Resulted:  05/02/20 2244     Updated:  05/02/20 2309    CT Abdomen Pelvis Without Contrast [[id number]] Resulted:  05/02/20 2244     Updated:  05/02/20 2309    CT Chest Without Contrast [[id number]] Resulted:  05/02/20 2244     Updated:  05/02/20 2309    XR Chest 1 View [[id number]] Collected:  05/02/20 2033     Updated:  05/02/20 2037    Narrative:       Exam:   XR CHEST 1 VW-       Date:  05/02/2020      History:  Male, age  89 years;ams, weak     COMPARISON:  Chest x-ray dated 11/23/2018     Findings :     The heart and mediastinum are normal in size. Chronic interstitial lung  changes. Biapical scarring. Nodular opacities predominantly seen in the  left lung. No pleural effusion. No measurable pneumothorax.  The bones  show no acute pathology.         Impression:       Impression:     Left-sided rounded nodular opacities in the left hemithorax.  Differential does include infection as well as metastatic disease.     This report was finalized on 05/02/2020 20:34 by Dr. Marita Grimm MD.        I have personally reviewed and  interpreted the radiology studies and ECG obtained at time of admission.     Assessment / Plan     Assessment:   Active Hospital Problems    Diagnosis   • Anemia   • Acute UTI (urinary tract infection)   • Sarcomatoid renal cell carcinoma (CMS/HCC)   • COPD (chronic obstructive pulmonary disease) (CMS/HCC)   • Heme positive stool   1.  Profound anemia plan is to finish transfusing second unit of blood will recheck labs after that consider further transfusions depending on hemoglobin level.  We will check substrates.  Since it is family wishes we will go ahead and ask GI to see I suspect they will have little to offer given his overall status and prognosis.  He states he did have a history of peptic ulcer disease many years ago but is not sure when.  2.  Heme positive stools possible GI bleed transfuse serial H&H's will continue Protonix for now IV.  Will ask GI to render an opinion about further work-up and allow them to discuss with son who is POA.  3.  Stage IV sarcomatoid renal cell carcinoma patient has an extremely poor prognosis as this is an aggressive form of renal carcinoma and has already progressed significantly.  If hospice is not already involved would recommend their involvement.  Will give PRN IV morphine for pain essentially comfort care only.  4.  UTI patient has had some dysuria once again poor historian the will hydrate with IV fluids and give IV Rocephin.  5.  Acute kidney injury I suspect due to poor p.o. intake and/or progression of his cancer.  Will hydrate with IV fluids recheck labs in a.m.  Once again no aggressive work-up needed.      Patient seen after midnight         Code Status: DNR/DNI     I discussed the patient's findings and my recommendations with the patient ER attending.  This patient's son is his surrogate healthcare decision maker.    Estimated length of stay 1-2 nights.    Patient seen and examined by me on May 3, 2020 at 2 AM.    Tim Fernandes MD   05/03/20    02:18              Electronically signed by Tim Fernandes MD at 05/03/20 0243          Physician Progress Notes (most recent note)      Gallo Cunningham DO at 05/03/20 0959        The patient was admitted this morning by Dr. Fernandes.  He has been seen and evaluated by gastroenterology.  After discussion with the patient's son who is the patient's decision maker, he has decided that pursuing GI work-up would not be appropriate, given the patient's multiple comorbidities and risks, and has requested that his father be transitioned to comfort measures care.  I have consulted  for hospice referral and the patient's son plans for his father to return to his father's home where he will stay with him with hospice services attending to his brother's needs.    Gallo Cunningham DO  05/03/20  10:01      Electronically signed by Gallo Cunningham DO at 05/03/20 1001          Consult Notes (most recent note)      Ed Gross DO at 05/03/20 0825      Consult Orders    1. Inpatient Gastroenterology Consult [[id number]] ordered by Tim Fernandes MD at 05/03/20 0216                [facility] Gastroenterology  Initial Inpatient Consult Note    Referring Provider: Tim Fernandes MD    Reason for Consultation: Anemia/heme positive stool    Subjective     History of present illness:      This is a 89-year-old male patient who resides at an assisted living facility in [place] who was brought in to [facility] ER yesterday after nurses noted he had poor color.  Patient is not oriented, therefore history for this consult obtained by telephone discussion with his son.  Patient son has not seen him in 2 months due to precautions that have been placed for the COVID-19 virus.  However he is not aware of any visible signs of bleeding the patient may have had.  The patient did not exhibit abdominal pain during assessment.  He had a nephrectomy in February 2019 due to renal cancer and  was diagnosed with liver cancer by biopsy in 2020.      Past Medical History:  Past Medical History:   Diagnosis Date   • COPD (chronic obstructive pulmonary disease) (CMS/HCC)        Past Surgical History:  Past Surgical History:   Procedure Laterality Date   • URETHRAL DILATATION N/A 2018    Procedure: CYSTOSCOPY WITH URETHRAL DIALATION AND COMPLEX CATHETER PLACEMENT;  Surgeon: Alexx Garcia MD;  Location: [facility];  Service: Urology        Social History:   Social History     Tobacco Use   • Smoking status: Former Smoker     Last attempt to quit:      Years since quittin.3   • Smokeless tobacco: Never Used   Substance Use Topics   • Alcohol use: No     Frequency: Never        Family History:  History reviewed. No pertinent family history.    Home Meds:  Medications Prior to Admission   Medication Sig Dispense Refill Last Dose   • metoprolol tartrate (LOPRESSOR) 100 MG tablet Take 100 mg by mouth Daily.      • pantoprazole (PROTONIX) 40 MG EC tablet Take 40 mg by mouth Daily.   Past Week at Unknown time   • QUEtiapine (SEROquel) 25 MG tablet Take 1 tablet by mouth Every Night. 30 tablet 1    • roflumilast (DALIRESP) 500 MCG tablet tablet Take 500 mcg by mouth Daily.   Past Week at Unknown time   • Umeclidinium Bromide (Incruse Ellipta) 62.5 MCG/INH aerosol powder  Inhale 1 puff Daily.      • aclidinium bromide (TUDORZA PRESSAIR) 400 MCG/ACT aerosol powder  powder for inhalation Inhale 1 puff 2 (Two) Times a Day.      • amLODIPine (NORVASC) 5 MG tablet Take 1 tablet by mouth Daily. 30 tablet 1    • aspirin 81 MG EC tablet Take 1 tablet by mouth Daily.      • budesonide-formoterol (SYMBICORT) 160-4.5 MCG/ACT inhaler Inhale 2 puffs 2 (Two) Times a Day.   Past Week at Unknown time       Current Meds:   Hospital Medications (active)       Dose Frequency Start End    acetaminophen (TYLENOL) 160 MG/5ML solution 650 mg 650 mg Every 4 Hours PRN 5/3/2020     Admin Instructions: Do not exceed 4  "grams of acetaminophen in a 24 hr period.<BR><BR>If given for pain, use the following pain scale: <BR>Mild Pain = Pain Score of 1-3, CPOT 1-2<BR>Moderate Pain = Pain Score of 4-6, CPOT 3-4<BR>Severe Pain = Pain Score of 7-10, CPOT 5-8    Route: Oral    Linked Group 1:  \"Or\" Linked Group Details        acetaminophen (TYLENOL) suppository 650 mg 650 mg Every 4 Hours PRN 5/3/2020     Admin Instructions: Do not exceed 4 grams of acetaminophen in a 24 hr period.<BR><BR>If given for pain, use the following pain scale: <BR>Mild Pain = Pain Score of 1-3, CPOT 1-2<BR>Moderate Pain = Pain Score of 4-6, CPOT 3-4<BR>Severe Pain = Pain Score of 7-10, CPOT 5-8    Route: Rectal    Linked Group 1:  \"Or\" Linked Group Details        acetaminophen (TYLENOL) tablet 650 mg 650 mg Every 4 Hours PRN 5/3/2020     Admin Instructions: Do not exceed 4 grams of acetaminophen in a 24 hr period.<BR><BR>If given for pain, use the following pain scale: <BR>Mild Pain = Pain Score of 1-3, CPOT 1-2<BR>Moderate Pain = Pain Score of 4-6, CPOT 3-4<BR>Severe Pain = Pain Score of 7-10, CPOT 5-8    Route: Oral    Linked Group 1:  \"Or\" Linked Group Details        amLODIPine (NORVASC) tablet 5 mg 5 mg Every 24 Hours Scheduled 5/3/2020     Admin Instructions: Caution: Look alike/sound alike drug alert. Avoid grapefruit juice.    Route: Oral    cefTRIAXone (ROCEPHIN) 1 g/100 mL 0.9% NS (MBP) 1 g Every 24 Hours 5/3/2020 5/8/2020    Admin Instructions: Caution: Look alike/sound alike drug alert. Break seal and mix to activiate vial before use.    Route: Intravenous    ipratropium-albuterol (DUO-NEB) nebulizer solution 3 mL 3 mL 4 Times Daily - RT 5/3/2020     Route: Nebulization    metoprolol tartrate (LOPRESSOR) tablet 100 mg 100 mg Daily 5/3/2020     Route: Oral    Morphine sulfate (PF) injection 1 mg 1 mg Every 4 Hours PRN 5/3/2020 5/13/2020    Admin Instructions: <BR><BR>Caution: Look alike/sound alike drug alert<BR><BR>If given for pain, use the following " "pain scale:<BR>Mild Pain = Pain Score of 1-3, CPOT 1-2<BR>Moderate Pain = Pain Score of 4-6, CPOT 3-4<BR>Severe Pain = Pain Score of 7-10, CPOT 5-8    Route: Intravenous    Linked Group 2:  \"And\" Linked Group Details        naloxone (NARCAN) injection 0.4 mg 0.4 mg Every 5 Minutes PRN 5/3/2020     Admin Instructions: If respiratory rate is less than 8 breaths/minute or patient is difficult to arouse stop any narcotics and contact physician. <BR>Administer slow IV push. Repeat as ordered until patient's respiratory rate is greater than 12 breaths/minute.    Route: Intravenous    Linked Group 2:  \"And\" Linked Group Details        ondansetron (ZOFRAN) injection 4 mg 4 mg Every 6 Hours PRN 5/3/2020     Admin Instructions: If BOTH ondansetron (ZOFRAN) and promethazine (PHENERGAN) are ordered use ondansetron first and THEN promethazine IF ondansetron is ineffective.    Route: Intravenous    Linked Group 3:  \"Or\" Linked Group Details        ondansetron (ZOFRAN) tablet 4 mg 4 mg Every 6 Hours PRN 5/3/2020     Admin Instructions: If BOTH ondansetron (ZOFRAN) and promethazine (PHENERGAN) are ordered use ondansetron first and THEN promethazine IF ondansetron is ineffective.    Route: Oral    Linked Group 3:  \"Or\" Linked Group Details        pantoprazole (PROTONIX) 40 mg/100 mL (0.4 mg/mL) in 0.9% NS IVPB 8 mg/hr Continuous 5/3/2020     Admin Instructions: Break seal and mix to activate vial before use    Route: Intravenous    QUEtiapine (SEROquel) tablet 25 mg 25 mg Nightly 5/3/2020     Admin Instructions: Caution: Look alike/sound alike drug alert    Route: Oral    roflumilast (DALIRESP) tablet 500 mcg 500 mcg Daily 5/3/2020     Route: Oral    sodium chloride 0.9 % flush 10 mL 10 mL As Needed 5/2/2020     Route: Intravenous    Cosign for Ordering: Accepted by Robe Polanco MD on 5/2/2020  7:51 PM    Linked Group 4:  \"And\" Linked Group Details        sodium chloride 0.9 % flush 10 mL 10 mL Every 12 Hours " Scheduled 5/3/2020     Route: Intravenous    sodium chloride 0.9 % flush 10 mL 10 mL As Needed 5/3/2020     Route: Intravenous    sodium chloride 0.9 % infusion 100 mL/hr Continuous 5/3/2020     Route: Intravenous          Allergies:  No Known Allergies    Vital Signs  Temp:  [97.4 °F (36.3 °C)-98.1 °F (36.7 °C)] 98.1 °F (36.7 °C)  Heart Rate:  [61-74] 69  Resp:  [13-19] 16  BP: (108-149)/(47-95) 110/47  Body mass index is 25.56 kg/m².    Intake/Output Summary (Last 24 hours) at 5/3/2020 0901  Last data filed at 5/3/2020 0303  Gross per 24 hour   Intake 5500 ml   Output --   Net 5500 ml     No intake/output data recorded.    Review of Systems     Unobtainable due to patient's current mentation      Objective     Physical Exam:  General :    Alert, cooperative, in no acute distress, ill-appearing   Head:    Normocephalic, without obvious abnormality, atraumatic   Eyes:            Lids and lashes normal, conjunctivae and sclerae normal, no   Icterus, conjunctival pallor   Throat:   No oral lesions, no thrush, oral mucosa moist, posterior oropharynx clear   Neck:   No adenopathy, supple, trachea midline, no thyromegaly, no   carotid bruit, no JVD   Lungs:     Clear to auscultation,respirations regular, even and                   unlabored    Heart:    Regular rhythm and normal rate, normal S1 and S2, no            murmur   Chest Wall:    No abnormalities observed   Abdomen:     Normal bowel sounds, no masses, no organomegaly, soft        non-tender, non-distended, no guarding, no rebound                 tenderness   Rectal:     Deferred   Extremities:   no edema, no cyanosis   Skin:   No open lesions, bruising or rash   Lymph nodes:   No palpable cervical adenopathy   Psychiatric:  Judgement and insight: Confused   Orientation to person place and time: Confused   Mood and affect: normal        Results Review:    I have reviewed all of the patients current test results  Results from last 7 days   Lab Units  05/03/20  0814 05/03/20 0224 05/02/20 1946   WBC 10*3/mm3  --  7.73 9.44   HEMOGLOBIN g/dL 6.1* 6.3* 5.5*   HEMATOCRIT % 19.6* 20.3* 18.6*   PLATELETS 10*3/mm3  --  385 481*       Results from last 7 days   Lab Units 05/03/20 0224 05/02/20 1946   SODIUM mmol/L 141 143   POTASSIUM mmol/L 4.4 4.7   CHLORIDE mmol/L 107 105   CO2 mmol/L 24.0 22.0   BUN mg/dL 50* 53*   CREATININE mg/dL 1.65* 1.94*   CALCIUM mg/dL 9.2 10.1*   BILIRUBIN mg/dL  --  0.2   ALK PHOS U/L  --  108   ALT (SGPT) U/L  --  6   AST (SGOT) U/L  --  12   GLUCOSE mg/dL 132* 126*       Results from last 7 days   Lab Units 05/02/20 1946   INR  1.13*       Lab Results   Lab Value Date/Time    LIPASE 66 (H) 05/02/2020 1946       Radiology:    Imaging Results (Last 24 Hours)     Procedure Component Value Units Date/Time    CT Abdomen Pelvis Without Contrast [[id number]] Collected:  05/03/20 0850     Updated:  05/03/20 0901    Narrative:       CT ABDOMEN PELVIS WO CONTRAST- 5/2/2020 10:44 PM CDT     HISTORY: RUQ pain, konwn RCC      COMPARISON: None     DOSE LENGTH PRODUCT: 507 mGy cm. Automated exposure control was also  utilized to decrease patient radiation dose.     TECHNIQUE: Axial images of the abdomen and pelvis are obtained without  IV or oral contrast. The lack of contrast diminishes assessment of the  solid organs.     FINDINGS:  Patient has undergone a right nephrectomy. There is a 11.2 x  6.4 cm lesion within the right renal fossa which appears to invade the  right lobe of the liver. An 8.9 cm hypodense lesion is present within  the posterior right hepatic lobe adjacent to this finding. Findings  worrisome for local recurrence and adjacent metastasis. Surgical clips  are seen within the right retroperitoneum with adjacent soft tissue  worrisome for lymphadenopathy. There is suboptimal assessment without IV  contrast to distinguish lymph nodes from IVC. There is a 3.9 cm  hyperdense inferior left renal lesion with a 1.6 cm hyperdense left  mid  renal lesion. Incidental left renal cyst also noted. No left-sided  hydronephrosis. Bladder underdistended. Prostate calcifications. Fatty  containing inguinal hernias.     The nonenhanced spleen, pancreas, and adrenal glands are unremarkable.  The gallbladder surgically absent. No free intraperitoneal air loculated  abscess. Left colonic diverticulosis. No bowel obstruction. Diffuse  vascular calcification.     Degenerative change of the regional skeleton. Surgical screw of the  anterior left iliac bone. Transitional S1 vertebra. Advanced  degenerative change of the lumbar spine. Chronic loss of height of the  T12 vertebra. Heterogeneous appearance to the marrow of the lumbar  vertebra may relate to osteopenia and degenerative change. Underlying  bony metastasis is difficult to exclude.       Impression:       1. There is a large 11.8 x 6.4 cm lesion within the right  retroperitoneum/right renal fossa with adjacent invasion into the  posterior right hepatic lobe suggestive for tumor recurrence and  metastasis. Probable retroperitoneal/pericaval lymphadenopathy.  2. 2 nonspecific hyperdense left renal lesions. No left-sided  hydronephrosis.  3. Colonic diverticulosis with no acute diverticulitis. Small hiatal  hernia. No evidence of bowel obstruction. No free air or abscess.  4. Chronic T12 compression deformity. Advanced degenerative change  lumbar spine. Heterogeneous marrow of the lumbar vertebra may relate to  osteopenia degenerative change, however bony metastasis difficult to  exclude.           Comments: A preliminary report is issued to the ER by the [facility]  radiology service.  This report was finalized on 05/03/2020 08:58 by Dr. Debra Queen MD.    CT Chest Without Contrast [[id number]] Collected:  05/03/20 0834     Updated:  05/03/20 0853    Narrative:       CT CHEST WO CONTRAST- 5/2/2020 10:44 PM CDT     HISTORY: elevated d dimer, ams, sob; history of renal cancer     COMPARISON: 11/27/2019      DOSE LENGTH PRODUCT: 446 mGy cm. Automated exposure control was also  utilized to decrease patient radiation dose.     TECHNIQUE: Axial images the chest are obtained without IV contrast.     FINDINGS:  The 1.8 cm prevascular mediastinal nodule is again  visualized. No additional pathologic intrathoracic or axillary  lymphadenopathy seen with nonenhanced imaging. Moderate vascular  calcification of the normal caliber thoracic aorta with mild ectasia of  the descending thoracic aorta to maximum measurement of 3.9 cm. No  pericardial or pleural effusions.     Please refer to CT abdomen pelvis report for findings below the  hemidiaphragms.     There are multiple new bilateral pulmonary nodules measuring up to 2.4  cm in size highly suggestive for metastasis. There is a metastatic  lesion suspected of the left pleura along the left lateral third rib  measuring 4.0 x 1.9 cm. Mild cortical extension involving the left third  rib noted. Chronic peripheral interstitial lung changes seen within the  right upper lobe. No parenchymal consolidation. Mucous secretions  suspected of the trachea. No pneumothorax.     There is chronic compression of the superior endplate of the T12  vertebra. Advanced degenerative change at C7-T1. Subtle sclerotic  changes of the left lateral sixth rib       Impression:       1. There are scattered diffuse new bilateral pulmonary nodules highly  suggestive for pulmonary metastasis. There is a pleural/subpleural  nodule invading the left chest wall with mild destruction of the cortex  of the left lateral third rib. Questionable metastasis to the left sixth  rib.  2. Moderate vascular calcifications of the thoracic aorta and coronary  arteries.  3. Please refer to CT abdomen report for findings below the  hemidiaphragms.     Comments: A preliminary report is issued to the ER by the [facility]  radiology service.  This report was finalized on 05/03/2020 08:49 by Dr. Debra Queen MD.    CT Head  Without Contrast [[id number]] Collected:  05/03/20 0822     Updated:  05/03/20 0831    Narrative:       CT HEAD WO CONTRAST- 5/2/2020 10:44 PM CDT     HISTORY: new ams      COMPARISON: None     DOSE LENGTH PRODUCT: 733 mGy cm. Automated exposure control was also  utilized to decrease patient radiation dose.     TECHNIQUE: Axial images of brain obtained without contrast.     FINDINGS:  A 5 mm hyperdensity is seen below the left thalamus and is  age-indeterminate. This appears linear on the reconstructed sagittal  coronal images. There is mild to moderate cerebral volume loss.     Chronic small vessel ischemic changes. There are no convincing acute  signs of ischemia. No extra-axial hematoma or subarachnoid hemorrhage.     Right craniotomy changes. No depressed skull fracture. Vessel thickening  and/or mucous retention cyst or polyps of the ethmoid and sphenoid  sinuses. Chronic mucosal thickening of the maxillary sinuses.       Impression:       1. There is a 5 mm curvilinear hyperdensity seen just below the left  thalamus which is age indeterminate as there are no prior studies for  comparison. If altered mental status persists, repeat CT head or MRI  brain exam could be performed for further assessment.  2. Right craniotomy changes.  3. Mild to moderate cerebral volume loss with chronic small vessel  ischemia.  This report was finalized on 05/03/2020 08:28 by Dr. Debra Queen MD.    XR Chest 1 View [[id number]] Collected:  05/02/20 2033     Updated:  05/02/20 2037    Narrative:       Exam:   XR CHEST 1 VW-       Date:  05/02/2020      History:  Male, age  89 years;ams, weak     COMPARISON:  Chest x-ray dated 11/23/2018     Findings :     The heart and mediastinum are normal in size. Chronic interstitial lung  changes. Biapical scarring. Nodular opacities predominantly seen in the  left lung. No pleural effusion. No measurable pneumothorax.  The bones  show no acute pathology.         Impression:        Impression:     Left-sided rounded nodular opacities in the left hemithorax.  Differential does include infection as well as metastatic disease.     This report was finalized on 2020 20:34 by Dr. Marita Grimm MD.            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  Do not feel colonoscopy is reasonable option for a 89-year-old male patients with his comorbidities.  Patient's son did not wish to proceed with endoscopy at this time, he was interested in pursuing possible hospice and taking his father to his home.  Explained to patient son this would need to be evaluated further by primary services.  GI will continue to follow.  Further orders pending patient's progress.      David Lilly, FABIAN 8:55 AM 20        Ed Gross DO  20  09:01          Electronically signed by Ed Gross DO at 20 0902       Physical Therapy Notes (most recent note)    No notes exist for this encounter.         Occupational Therapy Notes (most recent note)    No notes exist for this encounter.       [address] [address]  Phone:  [phone]  Fax:          Patient:     Gilmar Parish MRN:  [id number]   [address] [place] [address] :  10/21/1924  SSN:    Phone: [phone] Sex:  M      INSURANCE PAYOR PLAN GROUP # SUBSCRIBER ID   Primary:  Secondary:     MEDICARE ANTHEM BLUE CROSS [id number]  [id number]    [id number]  [id number]   Admitting Diagnosis: Anemia, unspecified type [D64.9]  Order Date:  May 3, 2020         Case Management  Consult       (Order ID: [id number])     Diagnosis:         Priority:  Routine Expected Date:   Expiration Date:        Interval:   Count:    Is discharge planning needed? If yes, who is requesting discharge planning? Provider  Reason for Consult? hospice referral for possible DC Tuesday     Specimen Type:   Specimen Source:   Specimen Taken Date:   Specimen Taken Time:                   Authorizing Provider:Gallo Cunningham DO  Authorizing Provider's NPI: [id number]  Order Entered By: Gallo Cunningham DO 5/3/2020  9:44 AM     Electronically signed by: Gallo Cunningham DO 5/3/2020  9:44 AM

## 2020-05-03 NOTE — ED PROVIDER NOTES
Subjective   History of Present Illness    Patient is a 89-year-old male presenting to ED via EMS from [place] due to altered mental status.  Upon arrival to ED patient is oriented to person and place.  Patient reports that he does have right-sided pain but he cannot ascertain how long it is been there.  Patient denies any other pain or discomfort and is not sure why he is being evaluated.  Patient denies any pain, numbness, tingling, or other neurological complaints.    Records reviewed show on 3/30/20 urologist Dr. Pacheco at [place] had phone conversation with patient's son regarding a concerning liver lesion.  An oncologist was consulted who reported this is metastatic RCC however son reported that they would like to defer treatment due to patient's age.  Patient has no further inpatient or outpatient visits since then.    Review of Systems   Reason unable to perform ROS: Limited ability to obtain ROS due to altered mental status.   Gastrointestinal: Positive for abdominal pain (RUQ). Negative for nausea.   Genitourinary: Negative for dysuria.   Neurological: Negative for weakness and numbness.        Reports + altered mental status   Psychiatric/Behavioral: Positive for confusion.       Past Medical History:   Diagnosis Date   • COPD (chronic obstructive pulmonary disease) (CMS/HCC)        No Known Allergies    Past Surgical History:   Procedure Laterality Date   • URETHRAL DILATATION N/A 11/23/2018    Procedure: CYSTOSCOPY WITH URETHRAL DIALATION AND COMPLEX CATHETER PLACEMENT;  Surgeon: Alexx Garcia MD;  Location: [facility];  Service: Urology       History reviewed. No pertinent family history.    Social History     Socioeconomic History   • Marital status:      Spouse name: Not on file   • Number of children: Not on file   • Years of education: Not on file   • Highest education level: Not on file   Tobacco Use   • Smoking status: Former Smoker     Last attempt to quit: 1989     Years since  quittin.3   • Smokeless tobacco: Never Used   Substance and Sexual Activity   • Alcohol use: No     Frequency: Never   • Drug use: No   • Sexual activity: Defer           Objective   Physical Exam   Constitutional: He appears well-developed and well-nourished. He is cooperative. He is easily aroused. No distress.   HENT:   Head: Normocephalic and atraumatic.   Right Ear: External ear normal.   Left Ear: External ear normal.   Mouth/Throat: Oropharynx is clear and moist.   Eyes: Pupils are equal, round, and reactive to light. Conjunctivae and EOM are normal. Right eye exhibits no discharge. Left eye exhibits no discharge. No scleral icterus.   Neck: Normal range of motion. Neck supple.   Cardiovascular: Normal rate, regular rhythm, normal heart sounds and intact distal pulses.   No murmur heard.  Pulses:       Radial pulses are 2+ on the right side, and 2+ on the left side.        Posterior tibial pulses are 2+ on the right side, and 2+ on the left side.   Pulmonary/Chest: Effort normal and breath sounds normal. No respiratory distress.   Abdominal: Soft. Normal appearance and bowel sounds are normal. He exhibits no distension. There is tenderness in the right upper quadrant. There is no CVA tenderness.   Genitourinary: Rectal exam shows external hemorrhoid and guaiac positive stool. Rectal exam shows no internal hemorrhoid, no fissure, no mass, no tenderness and anal tone normal.   Genitourinary Comments: Chaperone present for entire examination, Gala CHAIDEZ.   Musculoskeletal: Normal range of motion. He exhibits no edema or tenderness.   Neurological: He is alert and easily aroused. He has normal strength. No cranial nerve deficit or sensory deficit. Coordination normal. GCS eye subscore is 4. GCS verbal subscore is 5. GCS motor subscore is 6.   CN II thru XII intact.  No pronator drift  5/5 symmetric strength to bilateral UE.  5/5 symmetric strength to bilateral LE.     Skin: Skin is warm and dry. Capillary  refill takes less than 2 seconds. He is not diaphoretic. No pallor.   Nursing note and vitals reviewed.      Procedures           ED Course  ED Course as of May 04 1647   Sat May 02, 2020   1952 RN spoke with son who reports patient had a neprectomy just over a year ago secondary to cancer and recently he was told he has stage 4 liver cancer which they have decided not to intervene with. Son reported they are letting patient be with no interventions.  Son reported patient has been confused on and off for the past 2 weeks and is coming from [place].    [JS]   2002 H&H decreased to 5.5/18.6.  No evidence of leukocytosis/leukopenia.  Thrombocytosis at 481.Remainder of labs pending at this time.Imaging pending transportation to department.    [JS]   2005 Discussed case with Dr. Robe Polanco. Rectal blood    [JS]   2010 Upon rectal examination patient is noted to be Hemoccult positive at this time.  Dr. Polanco made aware and in agreement with this with ordering Protonix.    [JS]   2016 Lactic elevated at 3.5, will order additional fluids.     [JS]   2037 Urinalysis with evidence of 6-12 RBC, 13-20 WBC, 1+ bacteria, 0-2 squamous epithelium, small leukocytes, nitrite positive.Elevated creatinine at 1.94, hypercalcemia 10.1.  Elevated procalcitonin at 0.26.  Ammonia decreased to 10.  LFTs WNL.  D-dimer elevated to 1.46.    [JS]   2202 Discussed with Dr. Polanco creatinine and decreased GFR. Dr. Polanco reported that even if patient had a PE he would not qualify for treatment at this time due to his GI bleed. Will perform all CT images non contrast at this time.     [JS]   2258 Patient in CT at this time.    [JS]   2335 Head CT shows: no intracranial hemorrhage, mass-effect, edema, or acute cortical infarct. Global parenchymal atrophy and chronic microvascular ischemic changes.     [JS]   2350 CT shows new and worsening lesions. Discussed case with Dr. Ozzie Dubose who is assuming supervising care at this  "time.     [JS]   5176 Discussed case with Dr. Fernandes, hospitalist. Dr. Fernandes reccomends completion of transfusion in ED and return to [facility] as patient would not likely want a colonoscopy during admission. Will attempt to call son to discuss inpatient vs return to [facility].     [JS]   Sun May 03, 2020   0005 Left voice message for Mr. Cirilo Parish, son.     [JS]   0044 Repeat lactate decreased to 1.7 at this time.     [JS]   0128 Phone discussion with Mr. Cirilo Parish at this time.  Patient son would like to \"pursue this bleed and figure out how to stop it.\"  Discussed with son lab and imaging findings, including worsening lesions.  Son feels that if patient were to be sent back to [place] \"they are not going to handle anything there and he is better off in a hospital setting to figure out what is causing this.\" Son confirmed that patient is a DNR.    [JS]   0136 Additional phone discussion with Dr. Fernandes.  Dr. Fernandes made aware of patient's son's request.  Dr. Fernandes will kindly accept patient for admission at this time.    [JS]      ED Course User Index  [JS] Ortega King PA-C                                           MDM  Number of Diagnoses or Management Options  Anemia, unspecified type:   Gastrointestinal hemorrhage, unspecified gastrointestinal hemorrhage type:   Liver lesion:   Metastatic renal cell carcinoma, unspecified laterality (CMS/HCC):      Amount and/or Complexity of Data Reviewed  Clinical lab tests: ordered and reviewed  Tests in the radiology section of CPT®: ordered and reviewed  Decide to obtain previous medical records or to obtain history from someone other than the patient: yes  Obtain history from someone other than the patient: yes (Son (Mr. Cirilo Parish))  Review and summarize past medical records: yes  Discuss the patient with other providers: yes (Dr. Polanco (attending), Dr. Fernandes (hospitalist))    Patient Progress  Patient progress: stable      Final diagnoses: "   Anemia, unspecified type   Gastrointestinal hemorrhage, unspecified gastrointestinal hemorrhage type   Metastatic renal cell carcinoma, unspecified laterality (CMS/HCC)   Liver lesion            Ortega King PA-C  05/04/20 5404

## 2020-05-03 NOTE — PROGRESS NOTES
The patient was admitted this morning by Dr. Fernandes.  He has been seen and evaluated by gastroenterology.  After discussion with the patient's son who is the patient's decision maker, he has decided that pursuing GI work-up would not be appropriate, given the patient's multiple comorbidities and risks, and has requested that his father be transitioned to comfort measures care.  I have consulted  for hospice referral and the patient's son plans for his father to return to his father's home where he will stay with him with hospice services attending to his brother's needs.    Gallo Cunningham DO  05/03/20  10:01

## 2020-05-03 NOTE — PLAN OF CARE
Patient continues to be pleasantly confused. Tolerating regular diet. Voiding per urinal, bed alarm on, safety maintained. GI followed up and determined he is not a candidate for scoping son spoke with Gi and hospitalist and they agree to hospice care. Possible DC tomorrow. SW following.

## 2020-05-03 NOTE — PLAN OF CARE
Problem: Patient Care Overview  Goal: Plan of Care Review  Outcome: Ongoing (interventions implemented as appropriate)  Flowsheets (Taken 5/3/2020 0350)  Progress: no change  Plan of Care Reviewed With: patient  Outcome Summary: Received pt from ER this shift. Pt is alert and pleasantly confused to all but person. VSS. No c/o or signs of pain. Pt finished receiving 2nd unit of blood shortly after arriving to floor. Protonix drip and IVF infusing as ordered. Telemetry in place. Safety maintained, will continue to monitor.

## 2020-05-03 NOTE — H&P
"    [facility] Medicine Services  HISTORY AND PHYSICAL    Date of Admission: 5/2/2020  Primary Care Physician: Maryse Ch DO    Subjective     Chief Complaint: Increased confusion    History of Present Illness  Patient is a 89-year-old white male past medical history of COPD and renal cell carcinoma status post right nephrectomy 2 years ago.  He also apparently has some early dementia.  He was sent over from [place] due to increasing confusion over the last 2 weeks.  He is found to have a mild UTI is also found to have a hemoglobin of 5.5 last from I can find was around 10.  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  Patient was evaluated in the ER as well found to have heme positive stool also imaging revealed a large 10 cm lesion arising from the surgical bed from his nephrectomy invading the liver.  He also has multiple pleural-based nodules largest 1 3.7 cm on the left upper lobe consistent with metastatic disease.  Patient has been transfused 1 unit working on his second unit of packed red cells.  Case was initially discussed with me my impression was that the patient is essentially hospice there is no point in admitting him for work-up however ER attending apparently discussed case with son and he stated he would like his father admitted to \"find out where the blood has gone\".  We have been asked to admit him for further evaluation.  The patient is a very poor historian he does complain of some right upper quadrant abdominal pain he also complains of worsening shortness of breath.  He is oriented to person somewhat to place he knows he is in the hospital he does not know the month he thinks it is 1954 and the Waldemar Forbes is the president [place].        Review of Systems   14 point review of systems negative except for as per HPI  However unreliable due to " dementia.  Otherwise complete ROS reviewed and negative except as mentioned in the HPI.    Past Medical History:   Past Medical History:   Diagnosis Date   • COPD (chronic obstructive pulmonary disease) (CMS/[facility])      Past Surgical History:  Past Surgical History:   Procedure Laterality Date   • URETHRAL DILATATION N/A 11/23/2018    Procedure: CYSTOSCOPY WITH URETHRAL DIALATION AND COMPLEX CATHETER PLACEMENT;  Surgeon: Alexx Garcia MD;  Location: [facility];  Service: Urology     Social History:  reports that he quit smoking about 31 years ago. He has never used smokeless tobacco. He reports that he does not drink alcohol or use drugs.    Family History:  Patient is unaware of his family history and is confused    Allergies:  No Known Allergies  Medications:  Prior to Admission medications    Medication Sig Start Date End Date Taking? Authorizing Provider   metoprolol tartrate (LOPRESSOR) 100 MG tablet Take 100 mg by mouth Daily.   Yes Gloria Antoine MD   pantoprazole (PROTONIX) 40 MG EC tablet Take 40 mg by mouth Daily.   Yes Gloria Antoine MD   QUEtiapine (SEROquel) 25 MG tablet Take 1 tablet by mouth Every Night. 11/30/18  Yes Waldemar Girard, DO   roflumilast (DALIRESP) 500 MCG tablet tablet Take 500 mcg by mouth Daily.   Yes Gloria Antoine MD   Umeclidinium Bromide (Incruse Ellipta) 62.5 MCG/INH aerosol powder  Inhale 1 puff Daily.   Yes Gloria Antoine MD   aclidinium bromide (TUDORZA PRESSAIR) 400 MCG/ACT aerosol powder  powder for inhalation Inhale 1 puff 2 (Two) Times a Day.    Gloria Antoine MD   amLODIPine (NORVASC) 5 MG tablet Take 1 tablet by mouth Daily. 12/1/18   Waldemar Girard DO   aspirin 81 MG EC tablet Take 1 tablet by mouth Daily. 11/25/18   Waldemar Girard, DO   budesonide-formoterol (SYMBICORT) 160-4.5 MCG/ACT inhaler Inhale 2 puffs 2 (Two) Times a Day.    Gloria Antoine MD     Objective     Vital Signs: /95   Pulse 71   Temp 97.6 °F  "(36.4 °C) (Oral)   Resp 17   Ht 193 cm (76\")   Wt 95.3 kg (210 lb)   SpO2 94%   BMI 25.56 kg/m²   Physical Exam  Gen.:  Well-nourished well-developed white male in no acute distress  HEENT: Atraumatic, normocephalic.  Pupils equal, round, and reactive to light. Extraocular movements intact.  Sclerae anicteric.  TMs negative.  Mucous membranes moist.  Neck is supple without lymphadenopathy.  No JVD is noted.  No carotid bruits are auscultated.  Oropharynx is without erythema or exudate.   Chest: Clear to auscultation and percussion.  CV: Regular rate and rhythm.  Normal S1-S2.  No gallops, murmurs. or rubs.  Abdomen: Soft, tenderness to palpation right upper quadrant nondistended.  Active bowel sounds,  No hepatosplenomegaly.  No masses.  No hernias.  Extremities: No clubbing, edema, or cyanosis.  Capillary refill is normal.  Pulses are 2+ and symmetric at radial and dorsalis pedis.  Posterior tibial pulses are intact and 2+ palpable.  Neuro: Patient is awake, alert, and oriented ×2 person somewhat to place knows he is in a hospital not which 1.  Cranial nerves II through XII are grossly intact.  Motor is 5 out of 5 bilaterally.  DTRs are 2+ and symmetric bilaterally. Sensory exam is nonfocal  Skin: Warm, dry, and intact.  No evidence of breakdown.  Sensorium: Mildly confused    Nursing notes and vital signs reviewed        Results Reviewed:  Lab Results (last 24 hours)     Procedure Component Value Units Date/Time    Lactic Acid, Reflex [[id number]]  (Normal) Collected:  05/03/20 0015    Specimen:  Blood Updated:  05/03/20 0039     Lactate 1.7 mmol/L     Lactic Acid, Reflex Timer (This will reflex a repeat order 3-3:15 hours after ordered.) [[id number]] Collected:  05/02/20 1946    Specimen:  Blood Updated:  05/02/20 2330     Hold Tube Hold for add-ons.     Comment: Auto resulted.       Blood Culture - Blood, Arm, Left [[id number]] Collected:  05/02/20 2020    Specimen:  Blood from Arm, Left Updated:  05/02/20 " 2039    Urinalysis With Culture If Indicated - Urine, Catheter In/Out [[id number]]  (Abnormal) Collected:  05/02/20 2002    Specimen:  Urine, Catheter In/Out Updated:  05/02/20 2028     Color, UA Dark Yellow     Appearance, UA Cloudy     pH, UA <=5.0     Specific Gravity, UA 1.024     Glucose, UA Negative     Ketones, UA Trace     Bilirubin, UA Negative     Blood, UA Negative     Protein, UA 30 mg/dL (1+)     Leuk Esterase, UA Small (1+)     Nitrite, UA Positive     Urobilinogen, UA 0.2 E.U./dL    Urinalysis, Microscopic Only - Urine, Catheter In/Out [[id number]]  (Abnormal) Collected:  05/02/20 2002    Specimen:  Urine, Catheter In/Out Updated:  05/02/20 2028     RBC, UA 6-12 /HPF      WBC, UA 13-20 /HPF      Bacteria, UA 1+ /HPF      Squamous Epithelial Cells, UA 0-2 /HPF      Hyaline Casts, UA 7-12 /LPF      Granular Casts, UA 3-6 /LPF      Methodology Manual Light Microscopy    Urine Culture - Urine, Urine, Catheter In/Out [[id number]] Collected:  05/02/20 2002    Specimen:  Urine, Catheter In/Out Updated:  05/02/20 2028    Ammonia [[id number]]  (Abnormal) Collected:  05/02/20 1946    Specimen:  Blood Updated:  05/02/20 2024     Ammonia 10 umol/L     Procalcitonin [[id number]]  (Abnormal) Collected:  05/02/20 1946    Specimen:  Blood Updated:  05/02/20 2019     Procalcitonin 0.26 ng/mL     Narrative:       As a Marker for Sepsis (Non-Neonates):   1. <0.5 ng/mL represents a low risk of severe sepsis and/or septic shock.  1. >2 ng/mL represents a high risk of severe sepsis and/or septic shock.    As a Marker for Lower Respiratory Tract Infections that require antibiotic therapy:  PCT on Admission     Antibiotic Therapy             6-12 Hrs later  > 0.5                Strongly Recommended            >0.25 - <0.5         Recommended  0.1 - 0.25           Discouraged                   Remeasure/reassess PCT  <0.1                 Strongly Discouraged          Remeasure/reassess PCT      As 28 day mortality risk marker:  "\"Change in Procalcitonin Result\" (> 80 % or <=80 %) if Day 0 (or Day 1) and Day 4 values are available. Refer to [email or web address]   Change in PCT <=80 %   A decrease of PCT levels below or equal to 80 % defines a positive change in PCT test result representing a higher risk for 28-day all-cause mortality of patients diagnosed with severe sepsis or septic shock.  Change in PCT > 80 %   A decrease of PCT levels of more than 80 % defines a negative change in PCT result representing a lower risk for 28-day all-cause mortality of patients diagnosed with severe sepsis or septic shock.                Results may be falsely decreased if patient taking Biotin.     Lactic Acid, Plasma [[id number]]  (Abnormal) Collected:  05/02/20 1946    Specimen:  Blood Updated:  05/02/20 2016     Lactate 3.5 mmol/L     Comprehensive Metabolic Panel [[id number]]  (Abnormal) Collected:  05/02/20 1946    Specimen:  Blood Updated:  05/02/20 2013     Glucose 126 mg/dL      BUN 53 mg/dL      Creatinine 1.94 mg/dL      Sodium 143 mmol/L      Potassium 4.7 mmol/L      Chloride 105 mmol/L      CO2 22.0 mmol/L      Calcium 10.1 mg/dL      Total Protein 7.3 g/dL      Albumin 4.00 g/dL      ALT (SGPT) 6 U/L      AST (SGOT) 12 U/L      Alkaline Phosphatase 108 U/L      Total Bilirubin 0.2 mg/dL      eGFR Non African Amer 32 mL/min/1.73      Globulin 3.3 gm/dL      A/G Ratio 1.2 g/dL      BUN/Creatinine Ratio 27.3     Anion Gap 16.0 mmol/L     Narrative:       GFR Normal >60  Chronic Kidney Disease <60  Kidney Failure <15      POC Occult Blood Stool [[id number]]  (Abnormal) Collected:  05/02/20 2010    Specimen:  Stool Updated:  05/02/20 2012     Fecal Occult Blood Positive     Lot Number [id number]     Expiration Date 12/31/2020     DEVELOPER LOT NUMBER [id number]     DEVELOPER EXPIRATION DATE 12/31/2020     Positive Control Positive     Negative Control Negative    Amylase [[id number]]  (Normal) Collected:  05/02/20 1946    Specimen:  Blood Updated: "  05/02/20 2010     Amylase 67 U/L     Lipase [[id number]]  (Abnormal) Collected:  05/02/20 1946    Specimen:  Blood Updated:  05/02/20 2008     Lipase 66 U/L     Protime-INR [[id number]]  (Abnormal) Collected:  05/02/20 1946    Specimen:  Blood Updated:  05/02/20 2008     Protime 14.1 Seconds      INR 1.13    aPTT [[id number]]  (Normal) Collected:  05/02/20 1946    Specimen:  Blood Updated:  05/02/20 2008     PTT 31.5 seconds     D-dimer, Quantitative [[id number]]  (Abnormal) Collected:  05/02/20 1946    Specimen:  Blood Updated:  05/02/20 2008     D-Dimer, Quantitative 1.46 mg/L (FEU)     Narrative:       Reference Range is 0-0.50 mg/L FEU. However, results <0.50 mg/L FEU tends to rule out DVT or PE. Results >0.50 mg/L FEU are not useful in predicting absence or presence of DVT or PE.      Blood Culture - Blood, Arm, Left [[id number]] Collected:  05/02/20 1946    Specimen:  Blood from Arm, Left Updated:  05/02/20 2005    CBC & Differential [[id number]] Collected:  05/02/20 1946    Specimen:  Blood Updated:  05/02/20 2002    Narrative:       The following orders were created for panel order CBC & Differential.  Procedure                               Abnormality         Status                     ---------                               -----------         ------                     CBC Auto Differential[[id number]]        Abnormal            Final result                 Please view results for these tests on the individual orders.    CBC Auto Differential [[id number]]  (Abnormal) Collected:  05/02/20 1946    Specimen:  Blood Updated:  05/02/20 2002     WBC 9.44 10*3/mm3      RBC 2.26 10*6/mm3      Hemoglobin 5.5 g/dL      Hematocrit 18.6 %      MCV 82.3 fL      MCH 24.3 pg      MCHC 29.6 g/dL      RDW 16.4 %      RDW-SD 49.1 fl      MPV 10.2 fL      Platelets 481 10*3/mm3      Neutrophil % 74.7 %      Lymphocyte % 12.8 %      Monocyte % 8.4 %      Eosinophil % 3.3 %      Basophil % 0.3 %      Immature Grans % 0.5 %       Neutrophils, Absolute 7.05 10*3/mm3      Lymphocytes, Absolute 1.21 10*3/mm3      Monocytes, Absolute 0.79 10*3/mm3      Eosinophils, Absolute 0.31 10*3/mm3      Basophils, Absolute 0.03 10*3/mm3      Immature Grans, Absolute 0.05 10*3/mm3      nRBC 0.0 /100 WBC     Blood Gas, Arterial [[id number]]  (Abnormal) Collected:  05/02/20 1959    Specimen:  Arterial Blood Updated:  05/02/20 2000     Site Right Radial     Lowell's Test Positive     pH, Arterial 7.431 pH units      pCO2, Arterial 33.3 mm Hg      Comment: 84 Value below reference range        pO2, Arterial 86.4 mm Hg      HCO3, Arterial 22.1 mmol/L      Base Excess, Arterial -2.0 mmol/L      Comment: 84 Value below reference range        O2 Saturation, Arterial 98.2 %      Temperature 37.0 C      Barometric Pressure for Blood Gas 749 mmHg      Modality Room Air     Ventilator Mode NA     Collected by [id number]     Comment: Meter: [id number]     :  [id number]           Imaging Results (Last 24 Hours)     Procedure Component Value Units Date/Time    CT Head Without Contrast [[id number]] Resulted:  05/02/20 2244     Updated:  05/02/20 2309    CT Abdomen Pelvis Without Contrast [[id number]] Resulted:  05/02/20 2244     Updated:  05/02/20 2309    CT Chest Without Contrast [[id number]] Resulted:  05/02/20 2244     Updated:  05/02/20 2309    XR Chest 1 View [[id number]] Collected:  05/02/20 2033     Updated:  05/02/20 2037    Narrative:       Exam:   XR CHEST 1 VW-       Date:  05/02/2020      History:  Male, age  89 years;ams, weak     COMPARISON:  Chest x-ray dated 11/23/2018     Findings :     The heart and mediastinum are normal in size. Chronic interstitial lung  changes. Biapical scarring. Nodular opacities predominantly seen in the  left lung. No pleural effusion. No measurable pneumothorax.  The bones  show no acute pathology.         Impression:       Impression:     Left-sided rounded nodular opacities in the left hemithorax.  Differential does include  infection as well as metastatic disease.     This report was finalized on 05/02/2020 20:34 by Dr. Marita Grimm MD.        I have personally reviewed and interpreted the radiology studies and ECG obtained at time of admission.     Assessment / Plan     Assessment:   Active Hospital Problems    Diagnosis   • Anemia   • Acute UTI (urinary tract infection)   • Sarcomatoid renal cell carcinoma (CMS/HCC)   • COPD (chronic obstructive pulmonary disease) (CMS/HCC)   • Heme positive stool   1.  Profound anemia plan is to finish transfusing second unit of blood will recheck labs after that consider further transfusions depending on hemoglobin level.  We will check substrates.  Since it is family wishes we will go ahead and ask GI to see I suspect they will have little to offer given his overall status and prognosis.  He states he did have a history of peptic ulcer disease many years ago but is not sure when.  2.  Heme positive stools possible GI bleed transfuse serial H&H's will continue Protonix for now IV.  Will ask GI to render an opinion about further work-up and allow them to discuss with son who is POA.  3.  Stage IV sarcomatoid renal cell carcinoma patient has an extremely poor prognosis as this is an aggressive form of renal carcinoma and has already progressed significantly.  If hospice is not already involved would recommend their involvement.  Will give PRN IV morphine for pain essentially comfort care only.  4.  UTI patient has had some dysuria once again poor historian the will hydrate with IV fluids and give IV Rocephin.  5.  Acute kidney injury I suspect due to poor p.o. intake and/or progression of his cancer.  Will hydrate with IV fluids recheck labs in a.m.  Once again no aggressive work-up needed.      Patient seen after midnight         Code Status: DNR/DNI     I discussed the patient's findings and my recommendations with the patient ER attending.  This patient's son is his surrogate healthcare  decision maker.    Estimated length of stay 1-2 nights.    Patient seen and examined by me on May 3, 2020 at 2 AM.    Tim Fernandes MD   05/03/20   02:18

## 2020-05-03 NOTE — CONSULTS
[facility]  Initial Inpatient Consult Note    Referring Provider: Tim Fernandes MD    Reason for Consultation: Anemia/heme positive stool    Subjective     History of present illness:      This is a 89-year-old male patient who resides at an assisted living facility in [place] who was brought in to [facility] ER yesterday after nurses noted he had poor color.  Patient is not oriented, therefore history for this consult obtained by telephone discussion with his son.  Patient son has not seen him in 2 months due to precautions that have been placed for the COVID-19 virus.  However he is not aware of any visible signs of bleeding the patient may have had.  The patient did not exhibit abdominal pain during assessment.  He had a nephrectomy in 2019 due to renal cancer and was diagnosed with liver cancer by biopsy in 2020.      Past Medical History:  Past Medical History:   Diagnosis Date   • COPD (chronic obstructive pulmonary disease) (CMS/HCC)        Past Surgical History:  Past Surgical History:   Procedure Laterality Date   • URETHRAL DILATATION N/A 2018    Procedure: CYSTOSCOPY WITH URETHRAL DIALATION AND COMPLEX CATHETER PLACEMENT;  Surgeon: Alexx Garcia MD;  Location: [facility];  Service: Urology        Social History:   Social History     Tobacco Use   • Smoking status: Former Smoker     Last attempt to quit:      Years since quittin.3   • Smokeless tobacco: Never Used   Substance Use Topics   • Alcohol use: No     Frequency: Never        Family History:  History reviewed. No pertinent family history.    Home Meds:  Medications Prior to Admission   Medication Sig Dispense Refill Last Dose   • metoprolol tartrate (LOPRESSOR) 100 MG tablet Take 100 mg by mouth Daily.      • pantoprazole (PROTONIX) 40 MG EC tablet Take 40 mg by mouth Daily.   Past Week at Unknown time   • QUEtiapine (SEROquel) 25 MG tablet Take 1 tablet by mouth Every  "Night. 30 tablet 1    • roflumilast (DALIRESP) 500 MCG tablet tablet Take 500 mcg by mouth Daily.   Past Week at Unknown time   • Umeclidinium Bromide (Incruse Ellipta) 62.5 MCG/INH aerosol powder  Inhale 1 puff Daily.      • aclidinium bromide (TUDORZA PRESSAIR) 400 MCG/ACT aerosol powder  powder for inhalation Inhale 1 puff 2 (Two) Times a Day.      • amLODIPine (NORVASC) 5 MG tablet Take 1 tablet by mouth Daily. 30 tablet 1    • aspirin 81 MG EC tablet Take 1 tablet by mouth Daily.      • budesonide-formoterol (SYMBICORT) 160-4.5 MCG/ACT inhaler Inhale 2 puffs 2 (Two) Times a Day.   Past Week at Unknown time       Current Meds:   Hospital Medications (active)       Dose Frequency Start End    acetaminophen (TYLENOL) 160 MG/5ML solution 650 mg 650 mg Every 4 Hours PRN 5/3/2020     Admin Instructions: Do not exceed 4 grams of acetaminophen in a 24 hr period.<BR><BR>If given for pain, use the following pain scale: <BR>Mild Pain = Pain Score of 1-3, CPOT 1-2<BR>Moderate Pain = Pain Score of 4-6, CPOT 3-4<BR>Severe Pain = Pain Score of 7-10, CPOT 5-8    Route: Oral    Linked Group 1:  \"Or\" Linked Group Details        acetaminophen (TYLENOL) suppository 650 mg 650 mg Every 4 Hours PRN 5/3/2020     Admin Instructions: Do not exceed 4 grams of acetaminophen in a 24 hr period.<BR><BR>If given for pain, use the following pain scale: <BR>Mild Pain = Pain Score of 1-3, CPOT 1-2<BR>Moderate Pain = Pain Score of 4-6, CPOT 3-4<BR>Severe Pain = Pain Score of 7-10, CPOT 5-8    Route: Rectal    Linked Group 1:  \"Or\" Linked Group Details        acetaminophen (TYLENOL) tablet 650 mg 650 mg Every 4 Hours PRN 5/3/2020     Admin Instructions: Do not exceed 4 grams of acetaminophen in a 24 hr period.<BR><BR>If given for pain, use the following pain scale: <BR>Mild Pain = Pain Score of 1-3, CPOT 1-2<BR>Moderate Pain = Pain Score of 4-6, CPOT 3-4<BR>Severe Pain = Pain Score of 7-10, CPOT 5-8    Route: Oral    Linked Group 1:  \"Or\" Linked " "Group Details        amLODIPine (NORVASC) tablet 5 mg 5 mg Every 24 Hours Scheduled 5/3/2020     Admin Instructions: Caution: Look alike/sound alike drug alert. Avoid grapefruit juice.    Route: Oral    cefTRIAXone (ROCEPHIN) 1 g/100 mL 0.9% NS (MBP) 1 g Every 24 Hours 5/3/2020 5/8/2020    Admin Instructions: Caution: Look alike/sound alike drug alert. Break seal and mix to activiate vial before use.    Route: Intravenous    ipratropium-albuterol (DUO-NEB) nebulizer solution 3 mL 3 mL 4 Times Daily - RT 5/3/2020     Route: Nebulization    metoprolol tartrate (LOPRESSOR) tablet 100 mg 100 mg Daily 5/3/2020     Route: Oral    Morphine sulfate (PF) injection 1 mg 1 mg Every 4 Hours PRN 5/3/2020 5/13/2020    Admin Instructions: <BR><BR>Caution: Look alike/sound alike drug alert<BR><BR>If given for pain, use the following pain scale:<BR>Mild Pain = Pain Score of 1-3, CPOT 1-2<BR>Moderate Pain = Pain Score of 4-6, CPOT 3-4<BR>Severe Pain = Pain Score of 7-10, CPOT 5-8    Route: Intravenous    Linked Group 2:  \"And\" Linked Group Details        naloxone (NARCAN) injection 0.4 mg 0.4 mg Every 5 Minutes PRN 5/3/2020     Admin Instructions: If respiratory rate is less than 8 breaths/minute or patient is difficult to arouse stop any narcotics and contact physician. <BR>Administer slow IV push. Repeat as ordered until patient's respiratory rate is greater than 12 breaths/minute.    Route: Intravenous    Linked Group 2:  \"And\" Linked Group Details        ondansetron (ZOFRAN) injection 4 mg 4 mg Every 6 Hours PRN 5/3/2020     Admin Instructions: If BOTH ondansetron (ZOFRAN) and promethazine (PHENERGAN) are ordered use ondansetron first and THEN promethazine IF ondansetron is ineffective.    Route: Intravenous    Linked Group 3:  \"Or\" Linked Group Details        ondansetron (ZOFRAN) tablet 4 mg 4 mg Every 6 Hours PRN 5/3/2020     Admin Instructions: If BOTH ondansetron (ZOFRAN) and promethazine (PHENERGAN) are ordered use " "ondansetron first and THEN promethazine IF ondansetron is ineffective.    Route: Oral    Linked Group 3:  \"Or\" Linked Group Details        pantoprazole (PROTONIX) 40 mg/100 mL (0.4 mg/mL) in 0.9% NS IVPB 8 mg/hr Continuous 5/3/2020     Admin Instructions: Break seal and mix to activate vial before use    Route: Intravenous    QUEtiapine (SEROquel) tablet 25 mg 25 mg Nightly 5/3/2020     Admin Instructions: Caution: Look alike/sound alike drug alert    Route: Oral    roflumilast (DALIRESP) tablet 500 mcg 500 mcg Daily 5/3/2020     Route: Oral    sodium chloride 0.9 % flush 10 mL 10 mL As Needed 5/2/2020     Route: Intravenous    Cosign for Ordering: Accepted by Robe Polanco MD on 5/2/2020  7:51 PM    Linked Group 4:  \"And\" Linked Group Details        sodium chloride 0.9 % flush 10 mL 10 mL Every 12 Hours Scheduled 5/3/2020     Route: Intravenous    sodium chloride 0.9 % flush 10 mL 10 mL As Needed 5/3/2020     Route: Intravenous    sodium chloride 0.9 % infusion 100 mL/hr Continuous 5/3/2020     Route: Intravenous          Allergies:  No Known Allergies    Vital Signs  Temp:  [97.4 °F (36.3 °C)-98.1 °F (36.7 °C)] 98.1 °F (36.7 °C)  Heart Rate:  [61-74] 69  Resp:  [13-19] 16  BP: (108-149)/(47-95) 110/47  Body mass index is 25.56 kg/m².    Intake/Output Summary (Last 24 hours) at 5/3/2020 0901  Last data filed at 5/3/2020 0303  Gross per 24 hour   Intake 5500 ml   Output --   Net 5500 ml     No intake/output data recorded.    Review of Systems     Unobtainable due to patient's current mentation      Objective     Physical Exam:  General :    Alert, cooperative, in no acute distress, ill-appearing   Head:    Normocephalic, without obvious abnormality, atraumatic   Eyes:            Lids and lashes normal, conjunctivae and sclerae normal, no   Icterus, conjunctival pallor   Throat:   No oral lesions, no thrush, oral mucosa moist, posterior oropharynx clear   Neck:   No adenopathy, supple, trachea midline, " no thyromegaly, no   carotid bruit, no JVD   Lungs:     Clear to auscultation,respirations regular, even and                   unlabored    Heart:    Regular rhythm and normal rate, normal S1 and S2, no            murmur   Chest Wall:    No abnormalities observed   Abdomen:     Normal bowel sounds, no masses, no organomegaly, soft        non-tender, non-distended, no guarding, no rebound                 tenderness   Rectal:     Deferred   Extremities:   no edema, no cyanosis   Skin:   No open lesions, bruising or rash   Lymph nodes:   No palpable cervical adenopathy   Psychiatric:  Judgement and insight: Confused   Orientation to person place and time: Confused   Mood and affect: normal        Results Review:    I have reviewed all of the patients current test results  Results from last 7 days   Lab Units 05/03/20 0814 05/03/20 0224 05/02/20 1946   WBC 10*3/mm3  --  7.73 9.44   HEMOGLOBIN g/dL 6.1* 6.3* 5.5*   HEMATOCRIT % 19.6* 20.3* 18.6*   PLATELETS 10*3/mm3  --  385 481*       Results from last 7 days   Lab Units 05/03/20 0224 05/02/20 1946   SODIUM mmol/L 141 143   POTASSIUM mmol/L 4.4 4.7   CHLORIDE mmol/L 107 105   CO2 mmol/L 24.0 22.0   BUN mg/dL 50* 53*   CREATININE mg/dL 1.65* 1.94*   CALCIUM mg/dL 9.2 10.1*   BILIRUBIN mg/dL  --  0.2   ALK PHOS U/L  --  108   ALT (SGPT) U/L  --  6   AST (SGOT) U/L  --  12   GLUCOSE mg/dL 132* 126*       Results from last 7 days   Lab Units 05/02/20 1946   INR  1.13*       Lab Results   Lab Value Date/Time    LIPASE 66 (H) 05/02/2020 1946       Radiology:    Imaging Results (Last 24 Hours)     Procedure Component Value Units Date/Time    CT Abdomen Pelvis Without Contrast [[id number]] Collected:  05/03/20 0850     Updated:  05/03/20 0901    Narrative:       CT ABDOMEN PELVIS WO CONTRAST- 5/2/2020 10:44 PM CDT     HISTORY: RUQ pain, konwn RCC      COMPARISON: None     DOSE LENGTH PRODUCT: 507 mGy cm. Automated exposure control was also  utilized to decrease patient  radiation dose.     TECHNIQUE: Axial images of the abdomen and pelvis are obtained without  IV or oral contrast. The lack of contrast diminishes assessment of the  solid organs.     FINDINGS:  Patient has undergone a right nephrectomy. There is a 11.2 x  6.4 cm lesion within the right renal fossa which appears to invade the  right lobe of the liver. An 8.9 cm hypodense lesion is present within  the posterior right hepatic lobe adjacent to this finding. Findings  worrisome for local recurrence and adjacent metastasis. Surgical clips  are seen within the right retroperitoneum with adjacent soft tissue  worrisome for lymphadenopathy. There is suboptimal assessment without IV  contrast to distinguish lymph nodes from IVC. There is a 3.9 cm  hyperdense inferior left renal lesion with a 1.6 cm hyperdense left mid  renal lesion. Incidental left renal cyst also noted. No left-sided  hydronephrosis. Bladder underdistended. Prostate calcifications. Fatty  containing inguinal hernias.     The nonenhanced spleen, pancreas, and adrenal glands are unremarkable.  The gallbladder surgically absent. No free intraperitoneal air loculated  abscess. Left colonic diverticulosis. No bowel obstruction. Diffuse  vascular calcification.     Degenerative change of the regional skeleton. Surgical screw of the  anterior left iliac bone. Transitional S1 vertebra. Advanced  degenerative change of the lumbar spine. Chronic loss of height of the  T12 vertebra. Heterogeneous appearance to the marrow of the lumbar  vertebra may relate to osteopenia and degenerative change. Underlying  bony metastasis is difficult to exclude.       Impression:       1. There is a large 11.8 x 6.4 cm lesion within the right  retroperitoneum/right renal fossa with adjacent invasion into the  posterior right hepatic lobe suggestive for tumor recurrence and  metastasis. Probable retroperitoneal/pericaval lymphadenopathy.  2. 2 nonspecific hyperdense left renal lesions.  No left-sided  hydronephrosis.  3. Colonic diverticulosis with no acute diverticulitis. Small hiatal  hernia. No evidence of bowel obstruction. No free air or abscess.  4. Chronic T12 compression deformity. Advanced degenerative change  lumbar spine. Heterogeneous marrow of the lumbar vertebra may relate to  osteopenia degenerative change, however bony metastasis difficult to  exclude.           Comments: A preliminary report is issued to the ER by the [facility]  radiology service.  This report was finalized on 05/03/2020 08:58 by Dr. Debra Queen MD.    CT Chest Without Contrast [[id number]] Collected:  05/03/20 0834     Updated:  05/03/20 0853    Narrative:       CT CHEST WO CONTRAST- 5/2/2020 10:44 PM CDT     HISTORY: elevated d dimer, ams, sob; history of renal cancer     COMPARISON: 11/27/2019     DOSE LENGTH PRODUCT: 446 mGy cm. Automated exposure control was also  utilized to decrease patient radiation dose.     TECHNIQUE: Axial images the chest are obtained without IV contrast.     FINDINGS:  The 1.8 cm prevascular mediastinal nodule is again  visualized. No additional pathologic intrathoracic or axillary  lymphadenopathy seen with nonenhanced imaging. Moderate vascular  calcification of the normal caliber thoracic aorta with mild ectasia of  the descending thoracic aorta to maximum measurement of 3.9 cm. No  pericardial or pleural effusions.     Please refer to CT abdomen pelvis report for findings below the  hemidiaphragms.     There are multiple new bilateral pulmonary nodules measuring up to 2.4  cm in size highly suggestive for metastasis. There is a metastatic  lesion suspected of the left pleura along the left lateral third rib  measuring 4.0 x 1.9 cm. Mild cortical extension involving the left third  rib noted. Chronic peripheral interstitial lung changes seen within the  right upper lobe. No parenchymal consolidation. Mucous secretions  suspected of the trachea. No pneumothorax.     There is chronic  compression of the superior endplate of the T12  vertebra. Advanced degenerative change at C7-T1. Subtle sclerotic  changes of the left lateral sixth rib       Impression:       1. There are scattered diffuse new bilateral pulmonary nodules highly  suggestive for pulmonary metastasis. There is a pleural/subpleural  nodule invading the left chest wall with mild destruction of the cortex  of the left lateral third rib. Questionable metastasis to the left sixth  rib.  2. Moderate vascular calcifications of the thoracic aorta and coronary  arteries.  3. Please refer to CT abdomen report for findings below the  hemidiaphragms.     Comments: A preliminary report is issued to the ER by the [facility]  radiology service.  This report was finalized on 05/03/2020 08:49 by Dr. Debra Queen MD.    CT Head Without Contrast [[id number]] Collected:  05/03/20 0822     Updated:  05/03/20 0831    Narrative:       CT HEAD WO CONTRAST- 5/2/2020 10:44 PM CDT     HISTORY: new ams      COMPARISON: None     DOSE LENGTH PRODUCT: 733 mGy cm. Automated exposure control was also  utilized to decrease patient radiation dose.     TECHNIQUE: Axial images of brain obtained without contrast.     FINDINGS:  A 5 mm hyperdensity is seen below the left thalamus and is  age-indeterminate. This appears linear on the reconstructed sagittal  coronal images. There is mild to moderate cerebral volume loss.     Chronic small vessel ischemic changes. There are no convincing acute  signs of ischemia. No extra-axial hematoma or subarachnoid hemorrhage.     Right craniotomy changes. No depressed skull fracture. Vessel thickening  and/or mucous retention cyst or polyps of the ethmoid and sphenoid  sinuses. Chronic mucosal thickening of the maxillary sinuses.       Impression:       1. There is a 5 mm curvilinear hyperdensity seen just below the left  thalamus which is age indeterminate as there are no prior studies for  comparison. If altered mental status  persists, repeat CT head or MRI  brain exam could be performed for further assessment.  2. Right craniotomy changes.  3. Mild to moderate cerebral volume loss with chronic small vessel  ischemia.  This report was finalized on 05/03/2020 08:28 by Dr. Debra Queen MD.    XR Chest 1 View [[id number]] Collected:  05/02/20 2033     Updated:  05/02/20 2037    Narrative:       Exam:   XR CHEST 1 VW-       Date:  05/02/2020      History:  Male, age  89 years;ams, weak     COMPARISON:  Chest x-ray dated 11/23/2018     Findings :     The heart and mediastinum are normal in size. Chronic interstitial lung  changes. Biapical scarring. Nodular opacities predominantly seen in the  left lung. No pleural effusion. No measurable pneumothorax.  The bones  show no acute pathology.         Impression:       Impression:     Left-sided rounded nodular opacities in the left hemithorax.  Differential does include infection as well as metastatic disease.     This report was finalized on 05/02/2020 20:34 by Dr. Marita Grimm MD.            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  Do not feel colonoscopy is reasonable option for a 89-year-old male patients with his comorbidities.  Patient's son did not wish to proceed with endoscopy at this time, he was interested in pursuing possible hospice and taking his father to his home.  Explained to patient son this would need to be  evaluated further by primary services.  GI will continue to follow.  Further orders pending patient's progress.      David Lilly, FABIAN 8:55 AM 05/03/20        Ed Gross DO  05/03/20  09:01

## 2020-05-03 NOTE — PROGRESS NOTES
Continued Stay Note  [facility]     Patient Name: Gilmar Parish  MRN: [id number]  Today's Date: 5/3/2020    Admit Date: 5/2/2020    Discharge Plan     Row Name 05/03/20 1021       Plan    Plan Comments  MESERET received consult for hospice orders on pt. SW spoke wit [facility] in regards to returning with hospice and they stated that phone call would have to be made on Monday when admissions are in house. MESERET faxed referral to [facility] and [facility] is aware of referral. MESERET will follow for discharge disposition on Monday.         Discharge Codes    No documentation.             Shireen Julio

## 2020-05-03 NOTE — ED NOTES
Pt presents to the ED via [facility] with complaints of AMS. Unknown onset of symptoms. Pt denies pain, numbness or tingling. Pt denies complaints. Pt is alert, oriented to person, disoriented to time, place and location.      Anderson Ritter RN  05/02/20 1939

## 2020-05-04 PROBLEM — D62 ACUTE BLOOD LOSS ANEMIA: Status: ACTIVE | Noted: 2020-05-03

## 2020-05-04 PROBLEM — F03.918 DEMENTIA WITH BEHAVIORAL DISTURBANCE (HCC): Status: ACTIVE | Noted: 2020-05-04

## 2020-05-04 LAB
BH BB BLOOD EXPIRATION DATE: NORMAL
BH BB BLOOD EXPIRATION DATE: NORMAL
BH BB BLOOD TYPE BARCODE: 5100
BH BB BLOOD TYPE BARCODE: 5100
BH BB DISPENSE STATUS: NORMAL
BH BB DISPENSE STATUS: NORMAL
BH BB PRODUCT CODE: NORMAL
BH BB PRODUCT CODE: NORMAL
BH BB UNIT NUMBER: NORMAL
BH BB UNIT NUMBER: NORMAL
CROSSMATCH INTERPRETATION: NORMAL
CROSSMATCH INTERPRETATION: NORMAL
UNIT  ABO: NORMAL
UNIT  ABO: NORMAL
UNIT  RH: NORMAL
UNIT  RH: NORMAL

## 2020-05-04 PROCEDURE — 99232 SBSQ HOSP IP/OBS MODERATE 35: CPT | Performed by: NURSE PRACTITIONER

## 2020-05-04 RX ADMIN — METOPROLOL TARTRATE 100 MG: 100 TABLET, FILM COATED ORAL at 09:02

## 2020-05-04 RX ADMIN — ACETAMINOPHEN 650 MG: 325 TABLET, FILM COATED ORAL at 09:02

## 2020-05-04 RX ADMIN — AMLODIPINE BESYLATE 5 MG: 5 TABLET ORAL at 09:02

## 2020-05-04 RX ADMIN — PANTOPRAZOLE SODIUM 40 MG: 40 TABLET, DELAYED RELEASE ORAL at 06:04

## 2020-05-04 RX ADMIN — QUETIAPINE FUMARATE 25 MG: 25 TABLET ORAL at 20:37

## 2020-05-04 RX ADMIN — SODIUM CHLORIDE, PRESERVATIVE FREE 10 ML: 5 INJECTION INTRAVENOUS at 09:03

## 2020-05-04 RX ADMIN — ROFLUMILAST 500 MCG: 500 TABLET ORAL at 09:02

## 2020-05-04 RX ADMIN — SODIUM CHLORIDE, PRESERVATIVE FREE 10 ML: 5 INJECTION INTRAVENOUS at 20:37

## 2020-05-04 RX ADMIN — CEFDINIR 300 MG: 300 CAPSULE ORAL at 09:02

## 2020-05-04 NOTE — PLAN OF CARE
Problem: Patient Care Overview  Goal: Plan of Care Review  Outcome: Ongoing (interventions implemented as appropriate)  Flowsheets (Taken 5/4/2020 9901)  Progress: no change  Plan of Care Reviewed With: patient  Outcome Summary: Pt pleasantly confused. VSS. No c/o pain. Bed alarm on, will attempt to get OOB but follows instructions and commands well. Safety maintained, will continue to monitor.

## 2020-05-04 NOTE — PROGRESS NOTES
[facility] Medicine Services  INPATIENT PROGRESS NOTE    Patient Name: Gilmar Parish  Date of Admission: 5/2/2020  Today's Date: 05/04/20  Length of Stay: 1  Primary Care Physician: Maryse Ch DO    Subjective   Chief Complaint: Confusion.   HPI   The patient has been confused and agitated at times.  Emily has arranged a sitter to be with him.    The plan is to return home with his son, Cirilo, on hospice tomorrow.  I discussed the case with Ailyn Nj from .    Review of Systems   Difficult with his agitation and confusion.    Objective    Temp:  [97.5 °F (36.4 °C)-98.2 °F (36.8 °C)] 97.5 °F (36.4 °C)  Heart Rate:  [67-88] 69  Resp:  [14-16] 16  BP: (102-128)/(42-69) 128/69  Physical Exam   Constitutional: No distress.   Up in bed.  Sitters present.  Discussed with his nurse, Emily.   HENT:   Head: Normocephalic and atraumatic.   Cardiovascular: Normal rate and regular rhythm.   Pulmonary/Chest: Effort normal. No respiratory distress.   Abdominal: Soft. Bowel sounds are normal.   Neurological: He is alert.   Psychiatric:   Flat, confused.  Not readily agitated on my visit, but trying to get out of bed and picking at things.   Nursing note and vitals reviewed.    Results Review:  I have reviewed the labs, radiology results, and diagnostic studies.    Laboratory Data:   No new labs as on comfort.     I have reviewed the patient's current medications.     Assessment/Plan     Active Hospital Problems    Diagnosis   • **Sarcomatoid renal cell carcinoma (CMS/HCC)   • Acute blood loss anemia   • Heme positive stool   • Acute UTI (urinary tract infection)   • Dementia with behavioral disturbance (CMS/HCC)   • COPD (chronic obstructive pulmonary disease) (CMS/HCC)     Plan:  The patient was admitted on 5/3 by Dr. Fernandes.  He was then evaluated by Dr. Cunningham.  He was admitted with increasing confusion and also was found to be progressively  anemic with concern for gastrointestinal bleeding.    Dr. Cunningham and Dr. Gross discussed the case with the patient's son and relayed to him that no further testing would be indicated at this point in time.  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    The patient's family has elected for comfort measures only and transition home with hospice care.  This will take place tomorrow after discussion with Ailyn Nj from social work.    Dr. Cunningham started him on Seroquel to try and assist in his agitation also circadian rhythm.  He also has medications available for discomfort and anxiolysis.    Discharge Planning: I expect the patient to be discharged to home with hospice tomorrow.     Waldemar Girard,    05/04/20   14:42

## 2020-05-04 NOTE — PROGRESS NOTES
Continued Stay Note  [facility]     Patient Name: Gilmar Parish  MRN: [id number]  Today's Date: 5/4/2020    Admit Date: 5/2/2020    Discharge Plan     Row Name 05/04/20 1329       Plan    Plan  Home with [facility]    Patient/Family in Agreement with Plan  yes    Plan Comments  Verified with Sara at [facility] [phone] that they did receive the referral and will be making contact with pt's son.     Row Name 05/04/20 1118       Plan    Plan  Home with [facility]    Patient/Family in Agreement with Plan  yes    Plan Comments  Spoke with pt's son, Cirilo [phone]. He has decided to take pt back to his own in house in [place] with hospice. Pt will have to go with [facility]. Referral faxed to them at [phone].        Discharge Codes    No documentation.             ADRIÁN Chester

## 2020-05-04 NOTE — DISCHARGE PLACEMENT REQUEST
"[place] [facility]\"" [phone]    Gilmar Robledo (89 y.o. Male)     Date of Birth Social Security Number Address Home Phone MRN    10/21/1924  [address] [place] [id number] [phone] [id number]    [place] Marital Status          Yarsani        Admission Date Admission Type Admitting Provider Attending Provider Department, Room/Bed    20 Emergency Waldemar Girard DO Hancock, John C,  [facility] 3[facility], 325/1    Discharge Date Discharge Disposition Discharge Destination                       Attending Provider:  Waldemar Girard DO    Allergies:  No Known Allergies    Isolation:  None   Infection:  None   Code Status:  No CPR    Ht:  193 cm (76\")   Wt:  95.3 kg (210 lb)    Admission Cmt:  None   Principal Problem:  None                Active Insurance as of 2020     Primary Coverage     Payor Plan Insurance Group Employer/Plan Group    MEDICARE MEDICARE A & B      Payor Plan Address Payor Plan Phone Number Payor Plan Fax Number Effective Dates    [address] [phone]  10/1/1989 - None Entered    [facility] [id number]       Subscriber Name Subscriber Birth Date Member ID       GILMAR ROBLEDO 10/21/1924 [id number]           Secondary Coverage     Payor Plan Insurance Group Employer/Plan Group    [facility] SUPP [id number]     Payor Plan Address Payor Plan Phone Number Payor Plan Fax Number Effective Dates    [address]   2017 - None Entered    [facility] [address]       Subscriber Name Subscriber Birth Date Member ID       GILMAR ROBLEDO 10/21/1924 [id number]                 Emergency Contacts      (Rel.) Home Phone Work Phone Mobile Phone    LEANNE ROBLEDO (Son) [phone] -- --        [facility] 3[facility]  [address] [place] [address]  Phone:  [phone]  Fax:          Patient:     Gilmar Robledo MRN:  [id number]   [address] [facility] [address] :  10/21/1924  SSN: "    Phone: [phone] Sex:  M      INSURANCE PAYOR PLAN GROUP # SUBSCRIBER ID   Primary:  Secondary:    MEDICARE  MEL BLUE CROSS [id number]  [id number]    [id number]  [id number]   Admitting Diagnosis: Anemia, unspecified type [D64.9]  Order Date:  May 3, 2020         Case Management  Consult       (Order ID: [id number])     Diagnosis:         Priority:  Routine Expected Date:   Expiration Date:        Interval:  Once Count:    Is discharge planning needed? If yes, who is requesting discharge planning? Provider  Reason for Consult? hospice referral for possible DC Tuesday     Specimen Type:   Specimen Source:   Specimen Taken Date:   Specimen Taken Time:                   Authorizing Provider:Gallo Cunningham DO  Authorizing Provider's NPI: [id number]  Order Entered By: Gallo Cunningham DO 5/3/2020  9:44 AM     Electronically signed by: Gallo Cunningham DO 5/3/2020  9:44 AM               History & Physical      Tim Fernandes MD at 05/03/20 0216              [facility] Medicine Services  HISTORY AND PHYSICAL    Date of Admission: 5/2/2020  Primary Care Physician: Maryse Ch DO    Subjective     Chief Complaint: Increased confusion    History of Present Illness  Patient is a 89-year-old white male past medical history of COPD and renal cell carcinoma status post right nephrectomy 2 years ago.  He also apparently has some early dementia.  He was sent over from [place] due to increasing confusion over the last 2 weeks.  He is found to have a mild UTI is also found to have a hemoglobin of 5.5 last from I can find was around 10.  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  Patient was evaluated in the ER as well found to have heme positive stool also imaging revealed a large 10 cm lesion arising from the surgical bed from his  "nephrectomy invading the liver.  He also has multiple pleural-based nodules largest 1 3.7 cm on the left upper lobe consistent with metastatic disease.  Patient has been transfused 1 unit working on his second unit of packed red cells.  Case was initially discussed with me my impression was that the patient is essentially hospice there is no point in admitting him for work-up however ER attending apparently discussed case with son and he stated he would like his father admitted to \"find out where the blood has gone\".  We have been asked to admit him for further evaluation.  The patient is a very poor historian he does complain of some right upper quadrant abdominal pain he also complains of worsening shortness of breath.  He is oriented to person somewhat to place he knows he is in the hospital he does not know the month he thinks it is 1954 and the Waldemar Forbes is the president [place].        Review of Systems   14 point review of systems negative except for as per HPI  However unreliable due to dementia.  Otherwise complete ROS reviewed and negative except as mentioned in the HPI.    Past Medical History:   Past Medical History:   Diagnosis Date   • COPD (chronic obstructive pulmonary disease) (CMS/HCC)      Past Surgical History:  Past Surgical History:   Procedure Laterality Date   • URETHRAL DILATATION N/A 11/23/2018    Procedure: CYSTOSCOPY WITH URETHRAL DIALATION AND COMPLEX CATHETER PLACEMENT;  Surgeon: Alexx Garcia MD;  Location: [facility] OR;  Service: Urology     Social History:  reports that he quit smoking about 31 years ago. He has never used smokeless tobacco. He reports that he does not drink alcohol or use drugs.    Family History:  Patient is unaware of his family history and is confused    Allergies:  No Known Allergies  Medications:  Prior to Admission medications    Medication Sig Start Date End Date Taking? Authorizing Provider   metoprolol tartrate (LOPRESSOR) 100 MG tablet Take " "100 mg by mouth Daily.   Yes Gloria Antoine MD   pantoprazole (PROTONIX) 40 MG EC tablet Take 40 mg by mouth Daily.   Yes Gloria Antoine MD   QUEtiapine (SEROquel) 25 MG tablet Take 1 tablet by mouth Every Night. 11/30/18  Yes Waldemar Girard DO   roflumilast (DALIRESP) 500 MCG tablet tablet Take 500 mcg by mouth Daily.   Yes Gloria Antoine MD   Umeclidinium Bromide (Incruse Ellipta) 62.5 MCG/INH aerosol powder  Inhale 1 puff Daily.   Yes Gloria Antoine MD   aclidinium bromide (TUDORZA PRESSAIR) 400 MCG/ACT aerosol powder  powder for inhalation Inhale 1 puff 2 (Two) Times a Day.    Gloria Antoine MD   amLODIPine (NORVASC) 5 MG tablet Take 1 tablet by mouth Daily. 12/1/18   Waldemar Girard DO   aspirin 81 MG EC tablet Take 1 tablet by mouth Daily. 11/25/18   Waldemar Girard, DO   budesonide-formoterol (SYMBICORT) 160-4.5 MCG/ACT inhaler Inhale 2 puffs 2 (Two) Times a Day.    Gloria Antoine MD     Objective     Vital Signs: /95   Pulse 71   Temp 97.6 °F (36.4 °C) (Oral)   Resp 17   Ht 193 cm (76\")   Wt 95.3 kg (210 lb)   SpO2 94%   BMI 25.56 kg/m²    Physical Exam  Gen.:  Well-nourished well-developed white male in no acute distress  HEENT: Atraumatic, normocephalic.  Pupils equal, round, and reactive to light. Extraocular movements intact.  Sclerae anicteric.  TMs negative.  Mucous membranes moist.  Neck is supple without lymphadenopathy.  No JVD is noted.  No carotid bruits are auscultated.  Oropharynx is without erythema or exudate.   Chest: Clear to auscultation and percussion.  CV: Regular rate and rhythm.  Normal S1-S2.  No gallops, murmurs. or rubs.  Abdomen: Soft, tenderness to palpation right upper quadrant nondistended.  Active bowel sounds,  No hepatosplenomegaly.  No masses.  No hernias.  Extremities: No clubbing, edema, or cyanosis.  Capillary refill is normal.  Pulses are 2+ and symmetric at radial and dorsalis pedis.  Posterior tibial pulses are " intact and 2+ palpable.  Neuro: Patient is awake, alert, and oriented × 2 person somewhat to place knows he is in a hospital not which 1.  Cranial nerves II through XII are grossly intact.  Motor is 5 out of 5 bilaterally.  DTRs are 2+ and symmetric bilaterally. Sensory exam is nonfocal  Skin: Warm, dry, and intact.  No evidence of breakdown.  Sensorium: Mildly confused    Nursing notes and vital signs reviewed        Results Reviewed:  Lab Results (last 24 hours)     Procedure Component Value Units Date/Time    Lactic Acid, Reflex [[id number]]  (Normal) Collected:  05/03/20 0015    Specimen:  Blood Updated:  05/03/20 0039     Lactate 1.7 mmol/L     Lactic Acid, Reflex Timer (This will reflex a repeat order 3-3:15 hours after ordered.) [[id number]] Collected:  05/02/20 1946    Specimen:  Blood Updated:  05/02/20 2330     Hold Tube Hold for add-ons.     Comment: Auto resulted.       Blood Culture - Blood, Arm, Left [[id number]] Collected:  05/02/20 2020    Specimen:  Blood from Arm, Left Updated:  05/02/20 2039    Urinalysis With Culture If Indicated - Urine, Catheter In/Out [[id number]]  (Abnormal) Collected:  05/02/20 2002    Specimen:  Urine, Catheter In/Out Updated:  05/02/20 2028     Color, UA Dark Yellow     Appearance, UA Cloudy     pH, UA <=5.0     Specific Gravity, UA 1.024     Glucose, UA Negative     Ketones, UA Trace     Bilirubin, UA Negative     Blood, UA Negative     Protein, UA 30 mg/dL (1+)     Leuk Esterase, UA Small (1+)     Nitrite, UA Positive     Urobilinogen, UA 0.2 E.U./dL    Urinalysis, Microscopic Only - Urine, Catheter In/Out [[id number]]  (Abnormal) Collected:  05/02/20 2002    Specimen:  Urine, Catheter In/Out Updated:  05/02/20 2028     RBC, UA 6-12 /HPF      WBC, UA 13-20 /HPF      Bacteria, UA 1+ /HPF      Squamous Epithelial Cells, UA 0-2 /HPF      Hyaline Casts, UA 7-12 /LPF      Granular Casts, UA 3-6 /LPF      Methodology Manual Light Microscopy    Urine Culture - Urine, Urine, Catheter  "In/Out [[id number]] Collected:  05/02/20 2002    Specimen:  Urine, Catheter In/Out Updated:  05/02/20 2028    Ammonia [[id number]]  (Abnormal) Collected:  05/02/20 1946    Specimen:  Blood Updated:  05/02/20 2024     Ammonia 10 umol/L     Procalcitonin [[id number]]  (Abnormal) Collected:  05/02/20 1946    Specimen:  Blood Updated:  05/02/20 2019     Procalcitonin 0.26 ng/mL     Narrative:       As a Marker for Sepsis (Non-Neonates):   1. <0.5 ng/mL represents a low risk of severe sepsis and/or septic shock.  1. >2 ng/mL represents a high risk of severe sepsis and/or septic shock.    As a Marker for Lower Respiratory Tract Infections that require antibiotic therapy:  PCT on Admission     Antibiotic Therapy             6-12 Hrs later  > 0.5                Strongly Recommended            >0.25 - <0.5         Recommended  0.1 - 0.25           Discouraged                   Remeasure/reassess PCT  <0.1                 Strongly Discouraged          Remeasure/reassess PCT      As 28 day mortality risk marker: \"Change in Procalcitonin Result\" (> 80 % or <=80 %) if Day 0 (or Day 1) and Day 4 values are available. Refer to http://[email or web address]pct-calculator.com/   Change in PCT <=80 %   A decrease of PCT levels below or equal to 80 % defines a positive change in PCT test result representing a higher risk for 28-day all-cause mortality of patients diagnosed with severe sepsis or septic shock.  Change in PCT > 80 %   A decrease of PCT levels of more than 80 % defines a negative change in PCT result representing a lower risk for 28-day all-cause mortality of patients diagnosed with severe sepsis or septic shock.                Results may be falsely decreased if patient taking Biotin.     Lactic Acid, Plasma [[id number]]  (Abnormal) Collected:  05/02/20 1946    Specimen:  Blood Updated:  05/02/20 2016     Lactate 3.5 mmol/L     Comprehensive Metabolic Panel [[id number]]  (Abnormal) Collected:  05/02/20 1946    Specimen:  Blood Updated:  " 05/02/20 2013     Glucose 126 mg/dL      BUN 53 mg/dL      Creatinine 1.94 mg/dL      Sodium 143 mmol/L      Potassium 4.7 mmol/L      Chloride 105 mmol/L      CO2 22.0 mmol/L      Calcium 10.1 mg/dL      Total Protein 7.3 g/dL      Albumin 4.00 g/dL      ALT (SGPT) 6 U/L      AST (SGOT) 12 U/L      Alkaline Phosphatase 108 U/L      Total Bilirubin 0.2 mg/dL      eGFR Non African Amer 32 mL/min/1.73      Globulin 3.3 gm/dL      A/G Ratio 1.2 g/dL      BUN/Creatinine Ratio 27.3     Anion Gap 16.0 mmol/L     Narrative:       GFR Normal >60  Chronic Kidney Disease <60  Kidney Failure <15      POC Occult Blood Stool [[id number]]  (Abnormal) Collected:  05/02/20 2010    Specimen:  Stool Updated:  05/02/20 2012     Fecal Occult Blood Positive     Lot Number [id number]     Expiration Date 12/31/2020     DEVELOPER LOT NUMBER [id number]     DEVELOPER EXPIRATION DATE 12/31/2020     Positive Control Positive     Negative Control Negative    Amylase [[id number]]  (Normal) Collected:  05/02/20 1946    Specimen:  Blood Updated:  05/02/20 2010     Amylase 67 U/L     Lipase [[id number]]  (Abnormal) Collected:  05/02/20 1946    Specimen:  Blood Updated:  05/02/20 2008     Lipase 66 U/L     Protime-INR [[id number]]  (Abnormal) Collected:  05/02/20 1946    Specimen:  Blood Updated:  05/02/20 2008     Protime 14.1 Seconds      INR 1.13    aPTT [[id number]]  (Normal) Collected:  05/02/20 1946    Specimen:  Blood Updated:  05/02/20 2008     PTT 31.5 seconds     D-dimer, Quantitative [[id number]]  (Abnormal) Collected:  05/02/20 1946    Specimen:  Blood Updated:  05/02/20 2008     D-Dimer, Quantitative 1.46 mg/L (FEU)     Narrative:       Reference Range is 0-0.50 mg/L FEU. However, results <0.50 mg/L FEU tends to rule out DVT or PE. Results >0.50 mg/L FEU are not useful in predicting absence or presence of DVT or PE.      Blood Culture - Blood, Arm, Left [[id number]] Collected:  05/02/20 1946    Specimen:  Blood from Arm, Left Updated:  05/02/20 2005    CBC  & Differential [[id number]] Collected:  05/02/20 1946    Specimen:  Blood Updated:  05/02/20 2002    Narrative:       The following orders were created for panel order CBC & Differential.  Procedure                               Abnormality         Status                     ---------                               -----------         ------                     CBC Auto Differential[[id number]]        Abnormal            Final result                 Please view results for these tests on the individual orders.    CBC Auto Differential [[id number]]  (Abnormal) Collected:  05/02/20 1946    Specimen:  Blood Updated:  05/02/20 2002     WBC 9.44 10*3/mm3      RBC 2.26 10*6/mm3      Hemoglobin 5.5 g/dL      Hematocrit 18.6 %      MCV 82.3 fL      MCH 24.3 pg      MCHC 29.6 g/dL      RDW 16.4 %      RDW-SD 49.1 fl      MPV 10.2 fL      Platelets 481 10*3/mm3      Neutrophil % 74.7 %      Lymphocyte % 12.8 %      Monocyte % 8.4 %      Eosinophil % 3.3 %      Basophil % 0.3 %      Immature Grans % 0.5 %      Neutrophils, Absolute 7.05 10*3/mm3      Lymphocytes, Absolute 1.21 10*3/mm3      Monocytes, Absolute 0.79 10*3/mm3      Eosinophils, Absolute 0.31 10*3/mm3      Basophils, Absolute 0.03 10*3/mm3      Immature Grans, Absolute 0.05 10*3/mm3      nRBC 0.0 /100 WBC     Blood Gas, Arterial [[id number]]  (Abnormal) Collected:  05/02/20 1959    Specimen:  Arterial Blood Updated:  05/02/20 2000     Site Right Radial     Lowell's Test Positive     pH, Arterial 7.431 pH units      pCO2, Arterial 33.3 mm Hg      Comment: 84 Value below reference range        pO2, Arterial 86.4 mm Hg      HCO3, Arterial 22.1 mmol/L      Base Excess, Arterial -2.0 mmol/L      Comment: 84 Value below reference range        O2 Saturation, Arterial 98.2 %      Temperature 37.0 C      Barometric Pressure for Blood Gas 749 mmHg      Modality Room Air     Ventilator Mode NA     Collected by [id number]     Comment: Meter: [id number]     :  [id number]             Imaging Results (Last 24 Hours)     Procedure Component Value Units Date/Time    CT Head Without Contrast [[id number]] Resulted:  05/02/20 2244     Updated:  05/02/20 2309    CT Abdomen Pelvis Without Contrast [[id number]] Resulted:  05/02/20 2244     Updated:  05/02/20 2309    CT Chest Without Contrast [[id number]] Resulted:  05/02/20 2244     Updated:  05/02/20 2309    XR Chest 1 View [[id number]] Collected:  05/02/20 2033     Updated:  05/02/20 2037    Narrative:       Exam:   XR CHEST 1 VW-       Date:  05/02/2020      History:  Male, age  89 years;ams, weak     COMPARISON:  Chest x-ray dated 11/23/2018     Findings :     The heart and mediastinum are normal in size. Chronic interstitial lung  changes. Biapical scarring. Nodular opacities predominantly seen in the  left lung. No pleural effusion. No measurable pneumothorax.  The bones  show no acute pathology.         Impression:       Impression:     Left-sided rounded nodular opacities in the left hemithorax.  Differential does include infection as well as metastatic disease.     This report was finalized on 05/02/2020 20:34 by Dr. Marita Grimm MD.        I have personally reviewed and interpreted the radiology studies and ECG obtained at time of admission.     Assessment / Plan     Assessment:   Active Hospital Problems    Diagnosis   • Anemia   • Acute UTI (urinary tract infection)   • Sarcomatoid renal cell carcinoma (CMS/HCC)   • COPD (chronic obstructive pulmonary disease) (CMS/HCC)   • Heme positive stool   1.  Profound anemia plan is to finish transfusing second unit of blood will recheck labs after that consider further transfusions depending on hemoglobin level.  We will check substrates.  Since it is family wishes we will go ahead and ask GI to see I suspect they will have little to offer given his overall status and prognosis.  He states he did have a history of peptic ulcer disease many years ago but is not sure when.  2.  Heme positive  stools possible GI bleed transfuse serial H&H's will continue Protonix for now IV.  Will ask GI to render an opinion about further work-up and allow them to discuss with son who is POA.  3.  Stage IV sarcomatoid renal cell carcinoma patient has an extremely poor prognosis as this is an aggressive form of renal carcinoma and has already progressed significantly.  If hospice is not already involved would recommend their involvement.  Will give PRN IV morphine for pain essentially comfort care only.  4.  UTI patient has had some dysuria once again poor historian the will hydrate with IV fluids and give IV Rocephin.  5.  Acute kidney injury I suspect due to poor p.o. intake and/or progression of his cancer.  Will hydrate with IV fluids recheck labs in a.m.  Once again no aggressive work-up needed.      Patient seen after midnight         Code Status: DNR/DNI     I discussed the patient's findings and my recommendations with the patient ER attending.  This patient's son is his surrogate healthcare decision maker.    Estimated length of stay 1-2 nights.    Patient seen and examined by me on May 3, 2020 at 2 AM.    Tim Fernandes MD   05/03/20   02:18              Electronically signed by Tim Fernandes MD at 05/03/20 0243          Physician Progress Notes (last 24 hours) (Notes from 05/03/20 1118 through 05/04/20 1118)      Ed Gross DO at 05/04/20 0810          [facility]  Inpatient Progress Note    Reason for Follow Up: Anemia    Subjective     Subjective:   Patient lying in bed eating regular diet without difficulty.  Patient denies abdominal pain.  Patient remains confused but is more oriented today compared to yesterday.  Case discussed with nursing who denies bowel movement or obvious signs of GI blood loss.  Patient anticipated to be discharged home with hospice.    Current Facility-Administered Medications:   •  acetaminophen (TYLENOL) tablet 650 mg, 650 mg, Oral, Q4H PRN  **OR** acetaminophen (TYLENOL) 160 MG/5ML solution 650 mg, 650 mg, Oral, Q4H PRN **OR** acetaminophen (TYLENOL) suppository 650 mg, 650 mg, Rectal, Q4H PRN, Tim Fernandes MD  •  acetaminophen (TYLENOL) tablet 650 mg, 650 mg, Oral, Q4H PRN **OR** acetaminophen (TYLENOL) 160 MG/5ML solution 650 mg, 650 mg, Oral, Q4H PRN **OR** acetaminophen (TYLENOL) suppository 650 mg, 650 mg, Rectal, Q4H PRN, Gallo Cunningham DO  •  amLODIPine (NORVASC) tablet 5 mg, 5 mg, Oral, Q24H, Tim Fernandes MD  •  cefdinir (OMNICEF) capsule 300 mg, 300 mg, Oral, Q24H, Gallo Cunningham DO, 300 mg at 05/03/20 1043  •  diphenoxylate-atropine (LOMOTIL) 2.5-0.025 MG per tablet 1 tablet, 1 tablet, Oral, Q2H PRN, Gallo Cunningham DO  •  Glycerin-Hypromellose- (ARTIFICIAL TEARS) 0.2-0.2-1 % ophthalmic solution solution 1 drop, 1 drop, Both Eyes, Q30 Min PRN, Gallo Cunningham DO  •  ipratropium-albuterol (DUO-NEB) nebulizer solution 3 mL, 3 mL, Nebulization, 4x Daily PRN, Gallo Cunningham DO  •  LORazepam (ATIVAN) tablet 0.5 mg, 0.5 mg, Oral, Q1H PRN **OR** LORazepam (ATIVAN) injection 0.5 mg, 0.5 mg, Intravenous, Q1H PRN **OR** LORazepam (ATIVAN) injection 0.5 mg, 0.5 mg, Intramuscular, Q1H PRN **OR** LORazepam (ATIVAN) 2 MG/ML concentrated solution 0.5 mg, 0.5 mg, Oral, Q1H PRN **OR** LORazepam (ATIVAN) 2 MG/ML concentrated solution 0.5 mg, 0.5 mg, Sublingual, Q1H PRN, Cunningham, Gallo S, DO  •  LORazepam (ATIVAN) tablet 1 mg, 1 mg, Oral, Q1H PRN **OR** LORazepam (ATIVAN) injection 1 mg, 1 mg, Intravenous, Q1H PRN **OR** LORazepam (ATIVAN) injection 1 mg, 1 mg, Intramuscular, Q1H PRN **OR** LORazepam (ATIVAN) 2 MG/ML concentrated solution 1 mg, 1 mg, Oral, Q1H PRN **OR** LORazepam (ATIVAN) 2 MG/ML concentrated solution 1 mg, 1 mg, Sublingual, Q1H PRN, Gallo Cunningham, DO  •  LORazepam (ATIVAN) tablet 2 mg, 2 mg, Oral, Q1H PRN **OR** LORazepam (ATIVAN) injection 2 mg, 2 mg, Intravenous, Q1H PRN **OR**  LORazepam (ATIVAN) injection 2 mg, 2 mg, Intramuscular, Q1H PRN **OR** LORazepam (ATIVAN) 2 MG/ML concentrated solution 2 mg, 2 mg, Oral, Q1H PRN **OR** LORazepam (ATIVAN) 2 MG/ML concentrated solution 2 mg, 2 mg, Sublingual, Q1H PRN, Gallo Cunningham DO  •  metoprolol tartrate (LOPRESSOR) tablet 100 mg, 100 mg, Oral, Daily, Tim Fernandes MD  •  Morphine sulfate (PF) injection 1 mg, 1 mg, Intravenous, Q4H PRN **AND** naloxone (NARCAN) injection 0.4 mg, 0.4 mg, Intravenous, Q5 Min PRN, Tim Fernandes MD  •  Morphine sulfate (PF) injection 1 mg, 1 mg, Intravenous, Q2H PRN, Gallo Cunningham DO  •  ondansetron (ZOFRAN) tablet 4 mg, 4 mg, Oral, Q6H PRN **OR** ondansetron (ZOFRAN) injection 4 mg, 4 mg, Intravenous, Q6H PRN, Tim Fernandes MD  •  pantoprazole (PROTONIX) EC tablet 40 mg, 40 mg, Oral, Q AM, Gallo Cunningham DO, 40 mg at 05/04/20 0604  •  QUEtiapine (SEROquel) tablet 25 mg, 25 mg, Oral, Nightly, Tim Fernandes MD, 25 mg at 05/03/20 2136  •  roflumilast (DALIRESP) tablet 500 mcg, 500 mcg, Oral, Daily, Tim Fernandes MD, 500 mcg at 05/03/20 1043  •  [COMPLETED] Insert peripheral IV, , , Once **AND** sodium chloride 0.9 % flush 10 mL, 10 mL, Intravenous, PRN, Tim Fernandes MD  •  sodium chloride 0.9 % flush 10 mL, 10 mL, Intravenous, Q12H, Tim Fernandes MD  •  sodium chloride 0.9 % flush 10 mL, 10 mL, Intravenous, PRN, Tim Fernandes MD    Review of Systems     Constitution:  negative for chills, fatigue and fevers  Eyes:  negativefor blurriness and change of vision  ENT:   negative for sore throat and voice change  Respiratory: negative for  cough and shortness of air  Cardiovascular:  Negative for chest pain or palpitations  Gastrointestinal:  negative for  See HPI  Genitourinary:  negativefor  blood in urine and painful urination  Integument: negative for  rash and redness  Hematologic / Lymphatic: negative for  excessive bleeding and easy  bruising  Musculoskeletal: negative for  joint pain and joint stiffness out of the ordinary  Neurological:  negative for  seizures and speech abnormal  Behavioral/Psych:  negative for  anxiety and depression out of the ordinary  Endocrine: negative for  diabetes:and weight loss, unintended  Allergies / Immunologic:  negative for  anaphylaxis and rash        Objective     Vital Signs  Temp:  [97.5 °F (36.4 °C)-98.2 °F (36.8 °C)] 97.5 °F (36.4 °C)  Heart Rate:  [63-88] 69  Resp:  [14-16] 16  BP: (102-128)/(42-69) 128/69  Body mass index is 25.56 kg/m².    Intake/Output Summary (Last 24 hours) at 5/4/2020 0856  Last data filed at 5/3/2020 2001  Gross per 24 hour   Intake --   Output 500 ml   Net -500 ml     No intake/output data recorded.       Physical Exam:   General: patient awake, alert and cooperative, confused   Eyes: Normal lids and lashes, no scleral icterus   Neck: supple, normal ROM   Skin: warm and dry, not jaundiced   Cardiovascular: regular rhythm and rate, no murmurs auscultated   Pulm: clear to auscultation bilaterally, regular and unlabored   Abdomen: soft, nontender, nondistended; normal bowel sounds   Rectal: deferred   Extremities: no rash or edema   Psychiatric: Normal mood and behavior; confusion         Results Review:    I have reviewed all of the patients current test results  Results from last 7 days   Lab Units 05/03/20 0814 05/03/20 0224 05/02/20 1946   WBC 10*3/mm3  --  7.73 9.44   HEMOGLOBIN g/dL 6.1* 6.3* 5.5*   HEMATOCRIT % 19.6* 20.3* 18.6*   PLATELETS 10*3/mm3  --  385 481*       Results from last 7 days   Lab Units 05/03/20 0224 05/02/20 1946   SODIUM mmol/L 141 143   POTASSIUM mmol/L 4.4 4.7   CHLORIDE mmol/L 107 105   CO2 mmol/L 24.0 22.0   BUN mg/dL 50* 53*   CREATININE mg/dL 1.65* 1.94*   CALCIUM mg/dL 9.2 10.1*   BILIRUBIN mg/dL  --  0.2   ALK PHOS U/L  --  108   ALT (SGPT) U/L  --  6   AST (SGOT) U/L  --  12   GLUCOSE mg/dL 132* 126*       Results from last 7 days   Lab  Units 05/02/20 1946   INR  1.13*       Lab Results   Lab Value Date/Time    LIPASE 66 (H) 05/02/2020 1946       Radiology:    Imaging Results (Last 24 Hours)     Procedure Component Value Units Date/Time    CT Abdomen Pelvis Without Contrast [[id number]] Collected:  05/03/20 0850     Updated:  05/03/20 0901    Narrative:       CT ABDOMEN PELVIS WO CONTRAST- 5/2/2020 10:44 PM CDT     HISTORY: RUQ pain, konwn RCC      COMPARISON: None     DOSE LENGTH PRODUCT: 507 mGy cm. Automated exposure control was also  utilized to decrease patient radiation dose.     TECHNIQUE: Axial images of the abdomen and pelvis are obtained without  IV or oral contrast. The lack of contrast diminishes assessment of the  solid organs.     FINDINGS:  Patient has undergone a right nephrectomy. There is a 11.2 x  6.4 cm lesion within the right renal fossa which appears to invade the  right lobe of the liver. An 8.9 cm hypodense lesion is present within  the posterior right hepatic lobe adjacent to this finding. Findings  worrisome for local recurrence and adjacent metastasis. Surgical clips  are seen within the right retroperitoneum with adjacent soft tissue  worrisome for lymphadenopathy. There is suboptimal assessment without IV  contrast to distinguish lymph nodes from IVC. There is a 3.9 cm  hyperdense inferior left renal lesion with a 1.6 cm hyperdense left mid  renal lesion. Incidental left renal cyst also noted. No left-sided  hydronephrosis. Bladder underdistended. Prostate calcifications. Fatty  containing inguinal hernias.     The nonenhanced spleen, pancreas, and adrenal glands are unremarkable.  The gallbladder surgically absent. No free intraperitoneal air loculated  abscess. Left colonic diverticulosis. No bowel obstruction. Diffuse  vascular calcification.     Degenerative change of the regional skeleton. Surgical screw of the  anterior left iliac bone. Transitional S1 vertebra. Advanced  degenerative change of the lumbar spine.  Chronic loss of height of the  T12 vertebra. Heterogeneous appearance to the marrow of the lumbar  vertebra may relate to osteopenia and degenerative change. Underlying  bony metastasis is difficult to exclude.       Impression:       1. There is a large 11.8 x 6.4 cm lesion within the right  retroperitoneum/right renal fossa with adjacent invasion into the  posterior right hepatic lobe suggestive for tumor recurrence and  metastasis. Probable retroperitoneal/pericaval lymphadenopathy.  2. 2 nonspecific hyperdense left renal lesions. No left-sided  hydronephrosis.  3. Colonic diverticulosis with no acute diverticulitis. Small hiatal  hernia. No evidence of bowel obstruction. No free air or abscess.  4. Chronic T12 compression deformity. Advanced degenerative change  lumbar spine. Heterogeneous marrow of the lumbar vertebra may relate to  osteopenia degenerative change, however bony metastasis difficult to  exclude.           Comments: A preliminary report is issued to the ER by the [facility]  radiology service.  This report was finalized on 05/03/2020 08:58 by Dr. Debra Queen MD.            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  No signs of GI bleed per nursing.  Nursing anticipates patient to be discharged home with hospice.  Continue supportive care at this time.    David Lilly, APRN 8:38 AM      Ed Gross,    20  08:56        Electronically signed by Ed Gross DO at 20 0856       [address] [address]  Phone:  [phone]  Fax:          Patient:     Gilmar Parish MRN:  [id number]   [address] [facility] [address] :  10/21/1924  SSN:    Phone: [phone] Sex:  M      INSURANCE PAYOR PLAN GROUP # SUBSCRIBER ID   Primary:  Secondary:    MEDICARE  [facility] [employer]-dor [id number]  [id number]    [id number]  [id number]   Admitting Diagnosis: Anemia, unspecified type [D64.9]  Order Date:  May 3, 2020         Case Management  Consult       (Order ID: [id number])     Diagnosis:         Priority:  Routine Expected Date:   Expiration Date:        Interval:  Once Count:    Is discharge planning needed? If yes, who is requesting discharge planning? Provider  Reason for Consult? hospice referral for possible DC Tuesday     Specimen Type:   Specimen Source:   Specimen Taken Date:   Specimen Taken Time:                   Authorizing Provider:Gallo Cunningham DO  Authorizing Provider's NPI: [id number]  Order Entered By: Gallo Cunningham DO 5/3/2020  9:44 AM     Electronically signed by: Gallo Cunningham DO 5/3/2020  9:44 AM

## 2020-05-04 NOTE — PLAN OF CARE
Patient c/o back pain. PRN tylenol given x1 - resting comfortably. Patient remains confused and agitated at times.Trying to climb out of bed multiple times. Sitter at bedside this afternoon. IID, voiding, regular diet. Comfort care continues. Planning for discharge tomorrow with hospice. Continue to monitor     Problem: Patient Care Overview  Goal: Plan of Care Review  Outcome: Ongoing (interventions implemented as appropriate)

## 2020-05-04 NOTE — PROGRESS NOTES
Continued Stay Note   [place]     Patient Name: Gilmar Parish  MRN: [id number]  Today's Date: 5/4/2020    Admit Date: 5/2/2020    Discharge Plan     Row Name 05/04/20 1118       Plan    Plan  Home with [facility]    Patient/Family in Agreement with Plan  yes    Plan Comments  Spoke with pt's son, iCrilo [phone]. He has decided to take pt back to his own in house in [place] with hospice. Pt will have to go with [facility]. Referral faxed to them at [phone].        Discharge Codes    No documentation.             ADRIÁN Chester

## 2020-05-04 NOTE — PROGRESS NOTES
[facility]  Inpatient Progress Note    Reason for Follow Up: Anemia    Subjective     Subjective:   Patient lying in bed eating regular diet without difficulty.  Patient denies abdominal pain.  Patient remains confused but is more oriented today compared to yesterday.  Case discussed with nursing who denies bowel movement or obvious signs of GI blood loss.  Patient anticipated to be discharged home with hospice.    Current Facility-Administered Medications:   •  acetaminophen (TYLENOL) tablet 650 mg, 650 mg, Oral, Q4H PRN **OR** acetaminophen (TYLENOL) 160 MG/5ML solution 650 mg, 650 mg, Oral, Q4H PRN **OR** acetaminophen (TYLENOL) suppository 650 mg, 650 mg, Rectal, Q4H PRN, Tim Fernandes MD  •  acetaminophen (TYLENOL) tablet 650 mg, 650 mg, Oral, Q4H PRN **OR** acetaminophen (TYLENOL) 160 MG/5ML solution 650 mg, 650 mg, Oral, Q4H PRN **OR** acetaminophen (TYLENOL) suppository 650 mg, 650 mg, Rectal, Q4H PRN, Gallo Cunningham DO  •  amLODIPine (NORVASC) tablet 5 mg, 5 mg, Oral, Q24H, Tim Fernandes MD  •  cefdinir (OMNICEF) capsule 300 mg, 300 mg, Oral, Q24H, Gallo Cunningham DO, 300 mg at 05/03/20 1043  •  diphenoxylate-atropine (LOMOTIL) 2.5-0.025 MG per tablet 1 tablet, 1 tablet, Oral, Q2H PRN, Gallo Cunningham DO  •  Glycerin-Hypromellose- (ARTIFICIAL TEARS) 0.2-0.2-1 % ophthalmic solution solution 1 drop, 1 drop, Both Eyes, Q30 Min PRN, Gallo Cunningham DO  •  ipratropium-albuterol (DUO-NEB) nebulizer solution 3 mL, 3 mL, Nebulization, 4x Daily PRN, Gallo Cunningham, DO  •  LORazepam (ATIVAN) tablet 0.5 mg, 0.5 mg, Oral, Q1H PRN **OR** LORazepam (ATIVAN) injection 0.5 mg, 0.5 mg, Intravenous, Q1H PRN **OR** LORazepam (ATIVAN) injection 0.5 mg, 0.5 mg, Intramuscular, Q1H PRN **OR** LORazepam (ATIVAN) 2 MG/ML concentrated solution 0.5 mg, 0.5 mg, Oral, Q1H PRN **OR** LORazepam (ATIVAN) 2 MG/ML concentrated solution 0.5 mg, 0.5 mg, Sublingual, Q1H PRN,  Gallo Cunningham DO  •  LORazepam (ATIVAN) tablet 1 mg, 1 mg, Oral, Q1H PRN **OR** LORazepam (ATIVAN) injection 1 mg, 1 mg, Intravenous, Q1H PRN **OR** LORazepam (ATIVAN) injection 1 mg, 1 mg, Intramuscular, Q1H PRN **OR** LORazepam (ATIVAN) 2 MG/ML concentrated solution 1 mg, 1 mg, Oral, Q1H PRN **OR** LORazepam (ATIVAN) 2 MG/ML concentrated solution 1 mg, 1 mg, Sublingual, Q1H PRN, Gallo Cunningham DO  •  LORazepam (ATIVAN) tablet 2 mg, 2 mg, Oral, Q1H PRN **OR** LORazepam (ATIVAN) injection 2 mg, 2 mg, Intravenous, Q1H PRN **OR** LORazepam (ATIVAN) injection 2 mg, 2 mg, Intramuscular, Q1H PRN **OR** LORazepam (ATIVAN) 2 MG/ML concentrated solution 2 mg, 2 mg, Oral, Q1H PRN **OR** LORazepam (ATIVAN) 2 MG/ML concentrated solution 2 mg, 2 mg, Sublingual, Q1H PRN, Gallo Cunningham DO  •  metoprolol tartrate (LOPRESSOR) tablet 100 mg, 100 mg, Oral, Daily, Tim Fernandes MD  •  Morphine sulfate (PF) injection 1 mg, 1 mg, Intravenous, Q4H PRN **AND** naloxone (NARCAN) injection 0.4 mg, 0.4 mg, Intravenous, Q5 Min PRN, Tim Fernandes MD  •  Morphine sulfate (PF) injection 1 mg, 1 mg, Intravenous, Q2H PRN, Gallo Cunningham DO  •  ondansetron (ZOFRAN) tablet 4 mg, 4 mg, Oral, Q6H PRN **OR** ondansetron (ZOFRAN) injection 4 mg, 4 mg, Intravenous, Q6H PRN, Tim Fernandes MD  •  pantoprazole (PROTONIX) EC tablet 40 mg, 40 mg, Oral, Q AM, Gallo Cunningham DO, 40 mg at 05/04/20 0604  •  QUEtiapine (SEROquel) tablet 25 mg, 25 mg, Oral, Nightly, Tim Fernandes MD, 25 mg at 05/03/20 2136  •  roflumilast (DALIRESP) tablet 500 mcg, 500 mcg, Oral, Daily, Tim Fernandes MD, 500 mcg at 05/03/20 1043  •  [COMPLETED] Insert peripheral IV, , , Once **AND** sodium chloride 0.9 % flush 10 mL, 10 mL, Intravenous, PRN, Tim Fernandes MD  •  sodium chloride 0.9 % flush 10 mL, 10 mL, Intravenous, Q12H, Tim Fernandes MD  •  sodium chloride 0.9 % flush 10 mL, 10 mL, Intravenous, PRN, Dom,  Tim LI MD    Review of Systems     Constitution:  negative for chills, fatigue and fevers  Eyes:  negativefor blurriness and change of vision  ENT:   negative for sore throat and voice change  Respiratory: negative for  cough and shortness of air  Cardiovascular:  Negative for chest pain or palpitations  Gastrointestinal:  negative for  See HPI  Genitourinary:  negativefor  blood in urine and painful urination  Integument: negative for  rash and redness  Hematologic / Lymphatic: negative for  excessive bleeding and easy bruising  Musculoskeletal: negative for  joint pain and joint stiffness out of the ordinary  Neurological:  negative for  seizures and speech abnormal  Behavioral/Psych:  negative for  anxiety and depression out of the ordinary  Endocrine: negative for  diabetes:and weight loss, unintended  Allergies / Immunologic:  negative for  anaphylaxis and rash        Objective     Vital Signs  Temp:  [97.5 °F (36.4 °C)-98.2 °F (36.8 °C)] 97.5 °F (36.4 °C)  Heart Rate:  [63-88] 69  Resp:  [14-16] 16  BP: (102-128)/(42-69) 128/69  Body mass index is 25.56 kg/m².    Intake/Output Summary (Last 24 hours) at 5/4/2020 0856  Last data filed at 5/3/2020 2001  Gross per 24 hour   Intake --   Output 500 ml   Net -500 ml     No intake/output data recorded.       Physical Exam:   General: patient awake, alert and cooperative, confused   Eyes: Normal lids and lashes, no scleral icterus   Neck: supple, normal ROM   Skin: warm and dry, not jaundiced   Cardiovascular: regular rhythm and rate, no murmurs auscultated   Pulm: clear to auscultation bilaterally, regular and unlabored   Abdomen: soft, nontender, nondistended; normal bowel sounds   Rectal: deferred   Extremities: no rash or edema   Psychiatric: Normal mood and behavior; confusion         Results Review:    I have reviewed all of the patients current test results  Results from last 7 days   Lab Units 05/03/20  0814 05/03/20  0224 05/02/20  1946   WBC 10*3/mm3  --   7.73 9.44   HEMOGLOBIN g/dL 6.1* 6.3* 5.5*   HEMATOCRIT % 19.6* 20.3* 18.6*   PLATELETS 10*3/mm3  --  385 481*       Results from last 7 days   Lab Units 05/03/20  0224 05/02/20 1946   SODIUM mmol/L 141 143   POTASSIUM mmol/L 4.4 4.7   CHLORIDE mmol/L 107 105   CO2 mmol/L 24.0 22.0   BUN mg/dL 50* 53*   CREATININE mg/dL 1.65* 1.94*   CALCIUM mg/dL 9.2 10.1*   BILIRUBIN mg/dL  --  0.2   ALK PHOS U/L  --  108   ALT (SGPT) U/L  --  6   AST (SGOT) U/L  --  12   GLUCOSE mg/dL 132* 126*       Results from last 7 days   Lab Units 05/02/20 1946   INR  1.13*       Lab Results   Lab Value Date/Time    LIPASE 66 (H) 05/02/2020 1946       Radiology:    Imaging Results (Last 24 Hours)     Procedure Component Value Units Date/Time    CT Abdomen Pelvis Without Contrast [[id number]] Collected:  05/03/20 0850     Updated:  05/03/20 0901    Narrative:       CT ABDOMEN PELVIS WO CONTRAST- 5/2/2020 10:44 PM CDT     HISTORY: RUQ pain, konwn RCC      COMPARISON: None     DOSE LENGTH PRODUCT: 507 mGy cm. Automated exposure control was also  utilized to decrease patient radiation dose.     TECHNIQUE: Axial images of the abdomen and pelvis are obtained without  IV or oral contrast. The lack of contrast diminishes assessment of the  solid organs.     FINDINGS:  Patient has undergone a right nephrectomy. There is a 11.2 x  6.4 cm lesion within the right renal fossa which appears to invade the  right lobe of the liver. An 8.9 cm hypodense lesion is present within  the posterior right hepatic lobe adjacent to this finding. Findings  worrisome for local recurrence and adjacent metastasis. Surgical clips  are seen within the right retroperitoneum with adjacent soft tissue  worrisome for lymphadenopathy. There is suboptimal assessment without IV  contrast to distinguish lymph nodes from IVC. There is a 3.9 cm  hyperdense inferior left renal lesion with a 1.6 cm hyperdense left mid  renal lesion. Incidental left renal cyst also noted. No  left-sided  hydronephrosis. Bladder underdistended. Prostate calcifications. Fatty  containing inguinal hernias.     The nonenhanced spleen, pancreas, and adrenal glands are unremarkable.  The gallbladder surgically absent. No free intraperitoneal air loculated  abscess. Left colonic diverticulosis. No bowel obstruction. Diffuse  vascular calcification.     Degenerative change of the regional skeleton. Surgical screw of the  anterior left iliac bone. Transitional S1 vertebra. Advanced  degenerative change of the lumbar spine. Chronic loss of height of the  T12 vertebra. Heterogeneous appearance to the marrow of the lumbar  vertebra may relate to osteopenia and degenerative change. Underlying  bony metastasis is difficult to exclude.       Impression:       1. There is a large 11.8 x 6.4 cm lesion within the right  retroperitoneum/right renal fossa with adjacent invasion into the  posterior right hepatic lobe suggestive for tumor recurrence and  metastasis. Probable retroperitoneal/pericaval lymphadenopathy.  2. 2 nonspecific hyperdense left renal lesions. No left-sided  hydronephrosis.  3. Colonic diverticulosis with no acute diverticulitis. Small hiatal  hernia. No evidence of bowel obstruction. No free air or abscess.  4. Chronic T12 compression deformity. Advanced degenerative change  lumbar spine. Heterogeneous marrow of the lumbar vertebra may relate to  osteopenia degenerative change, however bony metastasis difficult to  exclude.           Comments: A preliminary report is issued to the ER by the [facility]  radiology service.  This report was finalized on 05/03/2020 08:58 by Dr. Debra Queen MD.            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  No signs of GI bleed per nursing.  Nursing anticipates patient to be discharged home with hospice.  Continue supportive care at this time.    David Lilly, APRN 8:38 AM      Ed Gross,   05/04/20  08:56

## 2020-05-05 ENCOUNTER — READMISSION MANAGEMENT (OUTPATIENT)
Dept: CALL CENTER | Facility: HOSPITAL | Age: 85
End: 2020-05-05

## 2020-05-05 VITALS
RESPIRATION RATE: 16 BRPM | DIASTOLIC BLOOD PRESSURE: 85 MMHG | BODY MASS INDEX: 25.57 KG/M2 | WEIGHT: 210 LBS | OXYGEN SATURATION: 96 % | HEART RATE: 61 BPM | HEIGHT: 76 IN | TEMPERATURE: 97.7 F | SYSTOLIC BLOOD PRESSURE: 140 MMHG

## 2020-05-05 PROCEDURE — 99231 SBSQ HOSP IP/OBS SF/LOW 25: CPT | Performed by: NURSE PRACTITIONER

## 2020-05-05 RX ORDER — LORAZEPAM 2 MG/ML
1 CONCENTRATE ORAL
Qty: 30 ML | Refills: 0 | Status: SHIPPED | OUTPATIENT
Start: 2020-05-05

## 2020-05-05 RX ORDER — MORPHINE SULFATE 20 MG/ML
10 SOLUTION ORAL
Qty: 30 ML | Refills: 0 | Status: SHIPPED | OUTPATIENT
Start: 2020-05-05

## 2020-05-05 NOTE — PLAN OF CARE
Problem: Patient Care Overview  Goal: Plan of Care Review  Outcome: Ongoing (interventions implemented as appropriate)  Flowsheets  Taken 5/5/2020 0577  Progress: no change  Outcome Summary: Pt confused. Sitter at bedside. Sleeping through the night with seroquel. UP x 1 at bedside for attempt to use urinal; pt had no urine output this shift. VSS. Safety maintained. Will continue to monitor.  Taken 5/4/2020 2037  Plan of Care Reviewed With: patient

## 2020-05-05 NOTE — PROGRESS NOTES
Continued Stay Note   [place]     Patient Name: Gilmar Parish  MRN: [id number]  Today's Date: 5/5/2020    Admit Date: 5/2/2020    Discharge Plan     Row Name 05/05/20 1108       Plan    Plan  Home with [facility]    Patient/Family in Agreement with Plan  yes    Final Discharge Disposition Code  50 - home with hospice    Final Note  Pt is being d/c'ed home with [facility] [phone] today. Son Cirilo [phone] is wanting to transport pt home himself. DME is currently being delivered to the pt's home and Cirilo can  pt after that. Updated charge nurse.         Discharge Codes    No documentation.       Expected Discharge Date and Time     Expected Discharge Date Expected Discharge Time    May 5, 2020             ADRIÁN Chester

## 2020-05-05 NOTE — PROGRESS NOTES
[facility]  Inpatient Progress Note    Reason for Follow Up: Anemia    Subjective     Subjective:   Patient is lying in bed.  He is awake however remains confused.  He denies abdominal pain.  He is tolerating his regular diet without difficulty.  Care discussed with nursing present in the room whom denies signs of GI blood loss.    Current Facility-Administered Medications:   •  acetaminophen (TYLENOL) tablet 650 mg, 650 mg, Oral, Q4H PRN, 650 mg at 05/04/20 0902 **OR** acetaminophen (TYLENOL) 160 MG/5ML solution 650 mg, 650 mg, Oral, Q4H PRN **OR** acetaminophen (TYLENOL) suppository 650 mg, 650 mg, Rectal, Q4H PRN, Tim Fernandes MD  •  acetaminophen (TYLENOL) tablet 650 mg, 650 mg, Oral, Q4H PRN **OR** acetaminophen (TYLENOL) 160 MG/5ML solution 650 mg, 650 mg, Oral, Q4H PRN **OR** acetaminophen (TYLENOL) suppository 650 mg, 650 mg, Rectal, Q4H PRN, Gallo Cunningham S, DO  •  diphenoxylate-atropine (LOMOTIL) 2.5-0.025 MG per tablet 1 tablet, 1 tablet, Oral, Q2H PRN, Gallo Cunningham, DO  •  Glycerin-Hypromellose- (ARTIFICIAL TEARS) 0.2-0.2-1 % ophthalmic solution solution 1 drop, 1 drop, Both Eyes, Q30 Min PRN, Gallo Cunningham, DO  •  ipratropium-albuterol (DUO-NEB) nebulizer solution 3 mL, 3 mL, Nebulization, 4x Daily PRN, Gallo Cunningham, DO  •  LORazepam (ATIVAN) tablet 0.5 mg, 0.5 mg, Oral, Q1H PRN **OR** LORazepam (ATIVAN) injection 0.5 mg, 0.5 mg, Intravenous, Q1H PRN **OR** LORazepam (ATIVAN) injection 0.5 mg, 0.5 mg, Intramuscular, Q1H PRN **OR** LORazepam (ATIVAN) 2 MG/ML concentrated solution 0.5 mg, 0.5 mg, Oral, Q1H PRN **OR** LORazepam (ATIVAN) 2 MG/ML concentrated solution 0.5 mg, 0.5 mg, Sublingual, Q1H PRN, Gallo Cunningham, DO  •  LORazepam (ATIVAN) tablet 1 mg, 1 mg, Oral, Q1H PRN **OR** LORazepam (ATIVAN) injection 1 mg, 1 mg, Intravenous, Q1H PRN **OR** LORazepam (ATIVAN) injection 1 mg, 1 mg, Intramuscular, Q1H PRN **OR** LORazepam (ATIVAN) 2  MG/ML concentrated solution 1 mg, 1 mg, Oral, Q1H PRN **OR** LORazepam (ATIVAN) 2 MG/ML concentrated solution 1 mg, 1 mg, Sublingual, Q1H PRN, Gallo Cunningham,   •  LORazepam (ATIVAN) tablet 2 mg, 2 mg, Oral, Q1H PRN **OR** LORazepam (ATIVAN) injection 2 mg, 2 mg, Intravenous, Q1H PRN **OR** LORazepam (ATIVAN) injection 2 mg, 2 mg, Intramuscular, Q1H PRN **OR** LORazepam (ATIVAN) 2 MG/ML concentrated solution 2 mg, 2 mg, Oral, Q1H PRN **OR** LORazepam (ATIVAN) 2 MG/ML concentrated solution 2 mg, 2 mg, Sublingual, Q1H PRN, Gallo Cunningham,   •  Morphine sulfate (PF) injection 1 mg, 1 mg, Intravenous, Q4H PRN **AND** naloxone (NARCAN) injection 0.4 mg, 0.4 mg, Intravenous, Q5 Min PRN, Tim Fernandes MD  •  Morphine sulfate (PF) injection 1 mg, 1 mg, Intravenous, Q2H PRN, Gallo Cunningham,   •  ondansetron (ZOFRAN) tablet 4 mg, 4 mg, Oral, Q6H PRN **OR** ondansetron (ZOFRAN) injection 4 mg, 4 mg, Intravenous, Q6H PRN, Tim Fernandes MD  •  QUEtiapine (SEROquel) tablet 25 mg, 25 mg, Oral, Nightly, Tim Fernandes MD, 25 mg at 05/04/20 2037  •  [COMPLETED] Insert peripheral IV, , , Once **AND** sodium chloride 0.9 % flush 10 mL, 10 mL, Intravenous, PRN, Tim Fernandes MD  •  sodium chloride 0.9 % flush 10 mL, 10 mL, Intravenous, Q12H, Tim Fernandes MD, 10 mL at 05/04/20 2037  •  sodium chloride 0.9 % flush 10 mL, 10 mL, Intravenous, PRN, Tim Fernandes MD    Review of Systems     Constitution:  negative for chills, fatigue and fevers  Eyes:  negativefor blurriness and change of vision  ENT:   negative for sore throat and voice change  Respiratory: negative for  cough and shortness of air  Cardiovascular:  Negative for chest pain or palpitations  Gastrointestinal:  negative for  See HPI  Genitourinary:  negativefor  blood in urine and painful urination  Integument: negative for  rash and redness  Hematologic / Lymphatic: negative for  excessive bleeding and easy  bruising  Musculoskeletal: negative for  joint pain and joint stiffness out of the ordinary  Neurological:  negative for  seizures and speech abnormal  Behavioral/Psych:  negative for  anxiety and depression out of the ordinary  Endocrine: negative for  diabetes:and weight loss, unintended  Allergies / Immunologic:  negative for  anaphylaxis and rash        Objective     Vital Signs  Temp:  [97.7 °F (36.5 °C)-97.8 °F (36.6 °C)] 97.7 °F (36.5 °C)  Heart Rate:  [56-61] 61  Resp:  [16] 16  BP: (130-140)/(70-85) 140/85  Body mass index is 25.56 kg/m².    Intake/Output Summary (Last 24 hours) at 5/5/2020 0908  Last data filed at 5/5/2020 0846  Gross per 24 hour   Intake 240 ml   Output 1000 ml   Net -760 ml     I/O this shift:  In: 240 [P.O.:240]  Out: 200 [Urine:200]       Physical Exam:   General: patient awake, alert and cooperative, ill appearing   Eyes: Normal lids and lashes, no scleral icterus   Neck: supple, normal ROM   Skin: warm and dry, not jaundiced   Cardiovascular: regular rhythm and rate, no murmurs auscultated   Pulm: clear to auscultation bilaterally, regular and unlabored   Abdomen: soft, nontender, nondistended; normal bowel sounds   Rectal: deferred   Extremities: no rash or edema   Psychiatric: Normal mood and behavior; confusion         Results Review:    I have reviewed all of the patients current test results  Results from last 7 days   Lab Units 05/03/20  0814 05/03/20 0224 05/02/20 1946   WBC 10*3/mm3  --  7.73 9.44   HEMOGLOBIN g/dL 6.1* 6.3* 5.5*   HEMATOCRIT % 19.6* 20.3* 18.6*   PLATELETS 10*3/mm3  --  385 481*       Results from last 7 days   Lab Units 05/03/20 0224 05/02/20 1946   SODIUM mmol/L 141 143   POTASSIUM mmol/L 4.4 4.7   CHLORIDE mmol/L 107 105   CO2 mmol/L 24.0 22.0   BUN mg/dL 50* 53*   CREATININE mg/dL 1.65* 1.94*   CALCIUM mg/dL 9.2 10.1*   BILIRUBIN mg/dL  --  0.2   ALK PHOS U/L  --  108   ALT (SGPT) U/L  --  6   AST (SGOT) U/L  --  12   GLUCOSE mg/dL 132* 126*        Results from last 7 days   Lab Units 05/02/20 1946   INR  1.13*       Lab Results   Lab Value Date/Time    LIPASE 66 (H) 05/02/2020 1946       Radiology:    Imaging Results (Last 24 Hours)     ** No results found for the last 24 hours. 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  No signs of GI bleed per nursing.  Nursing anticipates patient to be discharged home with hospice.  Continue supportive care at this time.    David Lilly, APRN 8:50 AM      Ed Gross DO  05/05/20  09:08

## 2020-05-05 NOTE — DISCHARGE SUMMARY
[facility] Medicine Services  DISCHARGE SUMMARY       Date of Admission: 5/2/2020  Date of Discharge:  5/5/2020  Primary Care Physician: Maryse Ch DO    Presenting Problem/History of Present Illness:  Bleeding.     Final Discharge Diagnoses:  Active Hospital Problems    Diagnosis   • **Sarcomatoid renal cell carcinoma (CMS/HCC)   • Acute blood loss anemia   • Heme positive stool   • Acute UTI (urinary tract infection)   • Dementia with behavioral disturbance (CMS/HCC)   • COPD (chronic obstructive pulmonary disease) (CMS/HCC)     Consults: Dr. Gross with GI.     Procedures Performed: None.     Pertinent Test Results:   Imaging Results (Last 7 Days)     Procedure Component Value Units Date/Time    CT Abdomen Pelvis Without Contrast [[id number]] Collected:  05/03/20 0850     Updated:  05/03/20 0901    Narrative:       CT ABDOMEN PELVIS WO CONTRAST- 5/2/2020 10:44 PM CDT     HISTORY: RUQ pain, konwn RCC      COMPARISON: None     DOSE LENGTH PRODUCT: 507 mGy cm. Automated exposure control was also  utilized to decrease patient radiation dose.     TECHNIQUE: Axial images of the abdomen and pelvis are obtained without  IV or oral contrast. The lack of contrast diminishes assessment of the  solid organs.     FINDINGS:  Patient has undergone a right nephrectomy. There is a 11.2 x  6.4 cm lesion within the right renal fossa which appears to invade the  right lobe of the liver. An 8.9 cm hypodense lesion is present within  the posterior right hepatic lobe adjacent to this finding. Findings  worrisome for local recurrence and adjacent metastasis. Surgical clips  are seen within the right retroperitoneum with adjacent soft tissue  worrisome for lymphadenopathy. There is suboptimal assessment without IV  contrast to distinguish lymph nodes from IVC. There is a 3.9 cm  hyperdense inferior left renal lesion with a 1.6 cm hyperdense left mid  renal lesion. Incidental left  renal cyst also noted. No left-sided  hydronephrosis. Bladder underdistended. Prostate calcifications. Fatty  containing inguinal hernias.     The nonenhanced spleen, pancreas, and adrenal glands are unremarkable.  The gallbladder surgically absent. No free intraperitoneal air loculated  abscess. Left colonic diverticulosis. No bowel obstruction. Diffuse  vascular calcification.     Degenerative change of the regional skeleton. Surgical screw of the  anterior left iliac bone. Transitional S1 vertebra. Advanced  degenerative change of the lumbar spine. Chronic loss of height of the  T12 vertebra. Heterogeneous appearance to the marrow of the lumbar  vertebra may relate to osteopenia and degenerative change. Underlying  bony metastasis is difficult to exclude.       Impression:       1. There is a large 11.8 x 6.4 cm lesion within the right  retroperitoneum/right renal fossa with adjacent invasion into the  posterior right hepatic lobe suggestive for tumor recurrence and  metastasis. Probable retroperitoneal/pericaval lymphadenopathy.  2. 2 nonspecific hyperdense left renal lesions. No left-sided  hydronephrosis.  3. Colonic diverticulosis with no acute diverticulitis. Small hiatal  hernia. No evidence of bowel obstruction. No free air or abscess.  4. Chronic T12 compression deformity. Advanced degenerative change  lumbar spine. Heterogeneous marrow of the lumbar vertebra may relate to  osteopenia degenerative change, however bony metastasis difficult to  exclude.           Comments: A preliminary report is issued to the ER by the [facility]  radiology service.  This report was finalized on 05/03/2020 08:58 by Dr. Debra Queen MD.    CT Chest Without Contrast [[id number]] Collected:  05/03/20 0834     Updated:  05/03/20 0853    Narrative:       CT CHEST WO CONTRAST- 5/2/2020 10:44 PM CDT     HISTORY: elevated d dimer, ams, sob; history of renal cancer     COMPARISON: 11/27/2019     DOSE LENGTH PRODUCT: 446 mGy cm.  Automated exposure control was also  utilized to decrease patient radiation dose.     TECHNIQUE: Axial images the chest are obtained without IV contrast.     FINDINGS:  The 1.8 cm prevascular mediastinal nodule is again  visualized. No additional pathologic intrathoracic or axillary  lymphadenopathy seen with nonenhanced imaging. Moderate vascular  calcification of the normal caliber thoracic aorta with mild ectasia of  the descending thoracic aorta to maximum measurement of 3.9 cm. No  pericardial or pleural effusions.     Please refer to CT abdomen pelvis report for findings below the  hemidiaphragms.     There are multiple new bilateral pulmonary nodules measuring up to 2.4  cm in size highly suggestive for metastasis. There is a metastatic  lesion suspected of the left pleura along the left lateral third rib  measuring 4.0 x 1.9 cm. Mild cortical extension involving the left third  rib noted. Chronic peripheral interstitial lung changes seen within the  right upper lobe. No parenchymal consolidation. Mucous secretions  suspected of the trachea. No pneumothorax.     There is chronic compression of the superior endplate of the T12  vertebra. Advanced degenerative change at C7-T1. Subtle sclerotic  changes of the left lateral sixth rib       Impression:       1. There are scattered diffuse new bilateral pulmonary nodules highly  suggestive for pulmonary metastasis. There is a pleural/subpleural  nodule invading the left chest wall with mild destruction of the cortex  of the left lateral third rib. Questionable metastasis to the left sixth  rib.  2. Moderate vascular calcifications of the thoracic aorta and coronary  arteries.  3. Please refer to CT abdomen report for findings below the  hemidiaphragms.     Comments: A preliminary report is issued to the ER by the [facility]  radiology service.  This report was finalized on 05/03/2020 08:49 by Dr. Debra Queen MD.    CT Head Without Contrast [[id number]] Collected:   05/03/20 0822     Updated:  05/03/20 0831    Narrative:       CT HEAD WO CONTRAST- 5/2/2020 10:44 PM CDT     HISTORY: new ams      COMPARISON: None     DOSE LENGTH PRODUCT: 733 mGy cm. Automated exposure control was also  utilized to decrease patient radiation dose.     TECHNIQUE: Axial images of brain obtained without contrast.     FINDINGS:  A 5 mm hyperdensity is seen below the left thalamus and is  age-indeterminate. This appears linear on the reconstructed sagittal  coronal images. There is mild to moderate cerebral volume loss.     Chronic small vessel ischemic changes. There are no convincing acute  signs of ischemia. No extra-axial hematoma or subarachnoid hemorrhage.     Right craniotomy changes. No depressed skull fracture. Vessel thickening  and/or mucous retention cyst or polyps of the ethmoid and sphenoid  sinuses. Chronic mucosal thickening of the maxillary sinuses.       Impression:       1. There is a 5 mm curvilinear hyperdensity seen just below the left  thalamus which is age indeterminate as there are no prior studies for  comparison. If altered mental status persists, repeat CT head or MRI  brain exam could be performed for further assessment.  2. Right craniotomy changes.  3. Mild to moderate cerebral volume loss with chronic small vessel  ischemia.  This report was finalized on 05/03/2020 08:28 by Dr. Debra Queen MD.    XR Chest 1 View [[id number]] Collected:  05/02/20 2033     Updated:  05/02/20 2037    Narrative:       Exam:   XR CHEST 1 VW-       Date:  05/02/2020      History:  Male, age  89 years;ams, weak     COMPARISON:  Chest x-ray dated 11/23/2018     Findings :     The heart and mediastinum are normal in size. Chronic interstitial lung  changes. Biapical scarring. Nodular opacities predominantly seen in the  left lung. No pleural effusion. No measurable pneumothorax.  The bones  show no acute pathology.         Impression:       Impression:     Left-sided rounded nodular opacities  in the left hemithorax.  Differential does include infection as well as metastatic disease.     This report was finalized on 05/02/2020 20:34 by Dr. Marita Grimm MD.        Lab Results (last 7 days)     Procedure Component Value Units Date/Time    Blood Culture - Blood, Arm, Left [[id number]] Collected:  05/02/20 2020    Specimen:  Blood from Arm, Left Updated:  05/04/20 2045     Blood Culture No growth at 2 days    Blood Culture - Blood, Arm, Left [[id number]] Collected:  05/02/20 1946    Specimen:  Blood from Arm, Left Updated:  05/04/20 2015     Blood Culture No growth at 2 days    Urine Culture - Urine, Urine, Catheter In/Out [[id number]] Collected:  05/02/20 2002    Specimen:  Urine, Catheter In/Out Updated:  05/03/20 1253     Urine Culture >100,000 CFU/mL Mixed Janessa Isolated    Narrative:       Specimen contains mixed organisms of questionable pathogenicity which indicates contamination with commensal janessa.  Further identification is unlikely to provide clinically useful information.  Suggest recollection.    Folate [[id number]]  (Abnormal) Collected:  05/03/20 0224    Specimen:  Blood Updated:  05/03/20 1234     Folate 4.01 ng/mL     Narrative:       Results may be falsely increased if patient taking Biotin.      Vitamin B12 [[id number]]  (Abnormal) Collected:  05/03/20 0224    Specimen:  Blood Updated:  05/03/20 1234     Vitamin B-12 1,567 pg/mL     Narrative:       Results may be falsely increased if patient taking Biotin.      Hemoglobin & Hematocrit, Blood [[id number]]  (Abnormal) Collected:  05/03/20 0814    Specimen:  Blood Updated:  05/03/20 0836     Hemoglobin 6.1 g/dL      Hematocrit 19.6 %     Ferritin [[id number]]  (Normal) Collected:  05/02/20 1946    Specimen:  Blood Updated:  05/03/20 0324     Ferritin 72.74 ng/mL     Narrative:       Results may be falsely decreased if patient taking Biotin.      Iron Profile [[id number]]  (Abnormal) Collected:  05/02/20 1946    Specimen:  Blood Updated:  05/03/20  0318     Iron 23 mcg/dL      Iron Saturation 6 %      Transferrin 251 mg/dL      TIBC 374 mcg/dL     Basic Metabolic Panel [[id number]]  (Abnormal) Collected:  05/03/20 0224    Specimen:  Blood Updated:  05/03/20 0243     Glucose 132 mg/dL      BUN 50 mg/dL      Creatinine 1.65 mg/dL      Sodium 141 mmol/L      Potassium 4.4 mmol/L      Chloride 107 mmol/L      CO2 24.0 mmol/L      Calcium 9.2 mg/dL      eGFR Non African Amer 39 mL/min/1.73      BUN/Creatinine Ratio 30.3     Anion Gap 10.0 mmol/L     Narrative:       GFR Normal >60  Chronic Kidney Disease <60  Kidney Failure <15      Reticulocytes [[id number]]  (Abnormal) Collected:  05/02/20 1946    Specimen:  Blood Updated:  05/03/20 0242     Reticulocyte % 4.04 %      Reticulocyte Absolute 0.0901 10*6/mm3     CBC Auto Differential [[id number]]  (Abnormal) Collected:  05/03/20 0224    Specimen:  Blood Updated:  05/03/20 0240     WBC 7.73 10*3/mm3      RBC 2.46 10*6/mm3      Hemoglobin 6.3 g/dL      Hematocrit 20.3 %      MCV 82.5 fL      MCH 25.6 pg      MCHC 31.0 g/dL      RDW 15.9 %      RDW-SD 47.9 fl      MPV 9.5 fL      Platelets 385 10*3/mm3      Neutrophil % 64.5 %      Lymphocyte % 19.5 %      Monocyte % 9.8 %      Eosinophil % 5.3 %      Basophil % 0.4 %      Immature Grans % 0.5 %      Neutrophils, Absolute 4.98 10*3/mm3      Lymphocytes, Absolute 1.51 10*3/mm3      Monocytes, Absolute 0.76 10*3/mm3      Eosinophils, Absolute 0.41 10*3/mm3      Basophils, Absolute 0.03 10*3/mm3      Immature Grans, Absolute 0.04 10*3/mm3      nRBC 0.0 /100 WBC     Lactic Acid, Reflex [[id number]]  (Normal) Collected:  05/03/20 0015    Specimen:  Blood Updated:  05/03/20 0039     Lactate 1.7 mmol/L     Lactic Acid, Reflex Timer (This will reflex a repeat order 3-3:15 hours after ordered.) [[id number]] Collected:  05/02/20 1946    Specimen:  Blood Updated:  05/02/20 2330     Hold Tube Hold for add-ons.     Comment: Auto resulted.       Urinalysis With Culture If Indicated -  "Urine, Catheter In/Out [[id number]]  (Abnormal) Collected:  05/02/20 2002    Specimen:  Urine, Catheter In/Out Updated:  05/02/20 2028     Color, UA Dark Yellow     Appearance, UA Cloudy     pH, UA <=5.0     Specific Gravity, UA 1.024     Glucose, UA Negative     Ketones, UA Trace     Bilirubin, UA Negative     Blood, UA Negative     Protein, UA 30 mg/dL (1+)     Leuk Esterase, UA Small (1+)     Nitrite, UA Positive     Urobilinogen, UA 0.2 E.U./dL    Urinalysis, Microscopic Only - Urine, Catheter In/Out [[id number]]  (Abnormal) Collected:  05/02/20 2002    Specimen:  Urine, Catheter In/Out Updated:  05/02/20 2028     RBC, UA 6-12 /HPF      WBC, UA 13-20 /HPF      Bacteria, UA 1+ /HPF      Squamous Epithelial Cells, UA 0-2 /HPF      Hyaline Casts, UA 7-12 /LPF      Granular Casts, UA 3-6 /LPF      Methodology Manual Light Microscopy    Ammonia [[id number]]  (Abnormal) Collected:  05/02/20 1946    Specimen:  Blood Updated:  05/02/20 2024     Ammonia 10 umol/L     Procalcitonin [[id number]]  (Abnormal) Collected:  05/02/20 1946    Specimen:  Blood Updated:  05/02/20 2019     Procalcitonin 0.26 ng/mL     Narrative:       As a Marker for Sepsis (Non-Neonates):   1. <0.5 ng/mL represents a low risk of severe sepsis and/or septic shock.  1. >2 ng/mL represents a high risk of severe sepsis and/or septic shock.    As a Marker for Lower Respiratory Tract Infections that require antibiotic therapy:  PCT on Admission     Antibiotic Therapy             6-12 Hrs later  > 0.5                Strongly Recommended            >0.25 - <0.5         Recommended  0.1 - 0.25           Discouraged                   Remeasure/reassess PCT  <0.1                 Strongly Discouraged          Remeasure/reassess PCT      As 28 day mortality risk marker: \"Change in Procalcitonin Result\" (> 80 % or <=80 %) if Day 0 (or Day 1) and Day 4 values are available. Refer to [email or web address]   Change in PCT <=80 %   A decrease of PCT " levels below or equal to 80 % defines a positive change in PCT test result representing a higher risk for 28-day all-cause mortality of patients diagnosed with severe sepsis or septic shock.  Change in PCT > 80 %   A decrease of PCT levels of more than 80 % defines a negative change in PCT result representing a lower risk for 28-day all-cause mortality of patients diagnosed with severe sepsis or septic shock.                Results may be falsely decreased if patient taking Biotin.     Lactic Acid, Plasma [[id number]]  (Abnormal) Collected:  05/02/20 1946    Specimen:  Blood Updated:  05/02/20 2016     Lactate 3.5 mmol/L     Comprehensive Metabolic Panel [[id number]]  (Abnormal) Collected:  05/02/20 1946    Specimen:  Blood Updated:  05/02/20 2013     Glucose 126 mg/dL      BUN 53 mg/dL      Creatinine 1.94 mg/dL      Sodium 143 mmol/L      Potassium 4.7 mmol/L      Chloride 105 mmol/L      CO2 22.0 mmol/L      Calcium 10.1 mg/dL      Total Protein 7.3 g/dL      Albumin 4.00 g/dL      ALT (SGPT) 6 U/L      AST (SGOT) 12 U/L      Alkaline Phosphatase 108 U/L      Total Bilirubin 0.2 mg/dL      eGFR Non African Amer 32 mL/min/1.73      Globulin 3.3 gm/dL      A/G Ratio 1.2 g/dL      BUN/Creatinine Ratio 27.3     Anion Gap 16.0 mmol/L     Narrative:       GFR Normal >60  Chronic Kidney Disease <60  Kidney Failure <15      POC Occult Blood Stool [[id number]]  (Abnormal) Collected:  05/02/20 2010    Specimen:  Stool Updated:  05/02/20 2012     Fecal Occult Blood Positive     Lot Number [id number]     Expiration Date 12/31/2020     DEVELOPER LOT NUMBER [id number]     DEVELOPER EXPIRATION DATE 12/31/2020     Positive Control Positive     Negative Control Negative    Amylase [[id number]]  (Normal) Collected:  05/02/20 1946    Specimen:  Blood Updated:  05/02/20 2010     Amylase 67 U/L     Lipase [[id number]]  (Abnormal) Collected:  05/02/20 1946    Specimen:  Blood Updated:  05/02/20 2008     Lipase 66 U/L     Protime-INR [[id number]]   (Abnormal) Collected:  05/02/20 1946    Specimen:  Blood Updated:  05/02/20 2008     Protime 14.1 Seconds      INR 1.13    aPTT [[id number]]  (Normal) Collected:  05/02/20 1946    Specimen:  Blood Updated:  05/02/20 2008     PTT 31.5 seconds     D-dimer, Quantitative [[id number]]  (Abnormal) Collected:  05/02/20 1946    Specimen:  Blood Updated:  05/02/20 2008     D-Dimer, Quantitative 1.46 mg/L (FEU)     Narrative:       Reference Range is 0-0.50 mg/L FEU. However, results <0.50 mg/L FEU tends to rule out DVT or PE. Results >0.50 mg/L FEU are not useful in predicting absence or presence of DVT or PE.      CBC & Differential [[id number]] Collected:  05/02/20 1946    Specimen:  Blood Updated:  05/02/20 2002    Narrative:       The following orders were created for panel order CBC & Differential.  Procedure                               Abnormality         Status                     ---------                               -----------         ------                     CBC Auto Differential[[id number]]        Abnormal            Final result                 Please view results for these tests on the individual orders.    CBC Auto Differential [[id number]]  (Abnormal) Collected:  05/02/20 1946    Specimen:  Blood Updated:  05/02/20 2002     WBC 9.44 10*3/mm3      RBC 2.26 10*6/mm3      Hemoglobin 5.5 g/dL      Hematocrit 18.6 %      MCV 82.3 fL      MCH 24.3 pg      MCHC 29.6 g/dL      RDW 16.4 %      RDW-SD 49.1 fl      MPV 10.2 fL      Platelets 481 10*3/mm3      Neutrophil % 74.7 %      Lymphocyte % 12.8 %      Monocyte % 8.4 %      Eosinophil % 3.3 %      Basophil % 0.3 %      Immature Grans % 0.5 %      Neutrophils, Absolute 7.05 10*3/mm3      Lymphocytes, Absolute 1.21 10*3/mm3      Monocytes, Absolute 0.79 10*3/mm3      Eosinophils, Absolute 0.31 10*3/mm3      Basophils, Absolute 0.03 10*3/mm3      Immature Grans, Absolute 0.05 10*3/mm3      nRBC 0.0 /100 WBC     Blood Gas, Arterial [[id number]]  (Abnormal) Collected:   "05/02/20 1959    Specimen:  Arterial Blood Updated:  05/02/20 2000     Site Right Radial     Lowell's Test Positive     pH, Arterial 7.431 pH units      pCO2, Arterial 33.3 mm Hg      Comment: 84 Value below reference range        pO2, Arterial 86.4 mm Hg      HCO3, Arterial 22.1 mmol/L      Base Excess, Arterial -2.0 mmol/L      Comment: 84 Value below reference range        O2 Saturation, Arterial 98.2 %      Temperature 37.0 C      Barometric Pressure for Blood Gas 749 mmHg      Modality Room Air     Ventilator Mode NA     Collected by [id number]     Comment: Meter: [id number]     :  [id number]           Hospital Course:  The patient was admitted on 5/3 by Dr. Fernandes.  He was then evaluated by Dr. Cunningham.  He was admitted with increasing confusion and also was found to be progressively anemic with concern for gastrointestinal bleeding.     Dr. Cunningham and Dr. Gross discussed the case with the patient's son and relayed to him that no further testing would be indicated at this point in time. 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     The patient's family has elected for comfort measures only and transition home with hospice care. Things are set up today per Ailyn Nj from social work.     Dr. Cunningham continued his Seroquel to try and assist in his agitation also circadian rhythm.  He will go home with medications available for discomfort and anxiolysis.    Physical Exam on Discharge:  /85 (BP Location: Right arm, Patient Position: Lying)   Pulse 61   Temp 97.7 °F (36.5 °C) (Oral)   Resp 16   Ht 193 cm (76\")   Wt 95.3 kg (210 lb)   SpO2 96%   BMI 25.56 kg/m²   Physical Exam  Constitutional: No distress. Up in bed.  Sitters present.  Discussed with his nurse, Danielle.   Head: Normocephalic and atraumatic.   Cardiovascular: Normal rate and regular rhythm.   Pulmonary/Chest: Effort normal. No respiratory distress.   Abdominal: Soft. Bowel sounds " are normal.   Neurological: He is alert.   Psychiatric: Flat, confused.  Not agitated at present.   Nursing note and vitals reviewed.    Condition on Discharge: Guarded; home with hospice.     Discharge Disposition:  Home or Self Care    Discharge Medications:     Discharge Medications      New Medications      Instructions Start Date   LORazepam 2 MG/ML concentrated solution  Commonly known as:  ATIVAN   1 mg, Oral, Every 2 Hours PRN      morphine 100 MG/5ML solution concentrated solution   10 mg, Oral, Every 2 Hours PRN         Continue These Medications      Instructions Start Date   QUEtiapine 25 MG tablet  Commonly known as:  SEROquel   25 mg, Oral, Nightly         Stop These Medications    aclidinium bromide 400 MCG/ACT aerosol powder  powder for inhalation  Commonly known as:  TUDORZA PRESSAIR     amLODIPine 5 MG tablet  Commonly known as:  NORVASC     aspirin 81 MG EC tablet     budesonide-formoterol 160-4.5 MCG/ACT inhaler  Commonly known as:  SYMBICORT     Daliresp 500 MCG tablet tablet  Generic drug:  roflumilast     Incruse Ellipta 62.5 MCG/INH aerosol powder   Generic drug:  Umeclidinium Bromide     metoprolol tartrate 100 MG tablet  Commonly known as:  LOPRESSOR     pantoprazole 40 MG EC tablet  Commonly known as:  PROTONIX          Discharge Diet:   Diet Instructions     Advance Diet As Tolerated          Activity at Discharge:   Activity Instructions     Up WIth Assist          Follow-up Appointments:   Per hospice.     Test Results Pending at Discharge: None.     Waldemar Girard DO  05/05/20  13:54    Time: 20 minutes.

## 2020-05-05 NOTE — OUTREACH NOTE
Prep Survey      Responses   [facility] patient discharged from?  [place]   Is LACE score < 7 ?  No   Eligibility  Not Eligible   What are the reasons patient is not eligible?  Hospice/Pallative Care   COVID-19 Test Status  Not tested   Does the patient have one of the following disease processes/diagnoses(primary or secondary)?  Other   Prep survey completed?  Yes          Deedee Puri RN

## 2020-05-07 LAB
BACTERIA SPEC AEROBE CULT: NORMAL
BACTERIA SPEC AEROBE CULT: NORMAL

## (undated) DEVICE — PK CYSTO 30

## (undated) DEVICE — ST DIL URETH SCURVE 8TO20FR

## (undated) DEVICE — GLW STD STR 3CM .035X150CM

## (undated) DEVICE — PK TURNOVER CYSTO RM

## (undated) DEVICE — CATH FOL COUNCL 2WY 22F 5CC

## (undated) DEVICE — GLV SURG BIOGEL M LTX PF 7 1/2